# Patient Record
Sex: FEMALE | Race: BLACK OR AFRICAN AMERICAN | NOT HISPANIC OR LATINO | Employment: UNEMPLOYED | URBAN - METROPOLITAN AREA
[De-identification: names, ages, dates, MRNs, and addresses within clinical notes are randomized per-mention and may not be internally consistent; named-entity substitution may affect disease eponyms.]

---

## 2023-10-05 PROBLEM — Z86.39 H/O HYPERCHOLESTEROLEMIA: Status: ACTIVE | Noted: 2023-10-05

## 2023-10-05 PROBLEM — L08.9 LOCAL INFECTION OF WOUND: Status: ACTIVE | Noted: 2023-10-05

## 2023-10-05 PROBLEM — S31.109A OPEN WOUND OF ABDOMEN: Status: ACTIVE | Noted: 2023-10-05

## 2023-10-05 PROBLEM — E66.01 OBESITY, CLASS III, BMI 40-49.9 (MORBID OBESITY) (MULTI): Status: ACTIVE | Noted: 2023-10-05

## 2023-10-05 PROBLEM — R10.2 PELVIC PAIN IN FEMALE: Status: ACTIVE | Noted: 2023-10-05

## 2023-10-05 PROBLEM — Z86.79 H/O: HYPERTENSION: Status: ACTIVE | Noted: 2023-10-05

## 2023-10-05 PROBLEM — T14.8XXA LOCAL INFECTION OF WOUND: Status: ACTIVE | Noted: 2023-10-05

## 2023-10-05 PROBLEM — R63.2 POLYPHAGIA: Status: ACTIVE | Noted: 2023-10-05

## 2023-10-05 PROBLEM — Z04.9 CONDITION NOT FOUND: Status: ACTIVE | Noted: 2023-10-05

## 2023-10-05 RX ORDER — METFORMIN HYDROCHLORIDE EXTENDED-RELEASE TABLETS 500 MG/1
TABLET, FILM COATED, EXTENDED RELEASE ORAL
COMMUNITY

## 2023-10-05 RX ORDER — HYDROCHLOROTHIAZIDE 12.5 MG/1
1 CAPSULE ORAL DAILY
COMMUNITY
Start: 2019-07-22

## 2023-10-05 RX ORDER — CHOLECALCIFEROL (VITAMIN D3) 50 MCG
1 TABLET ORAL DAILY
COMMUNITY
Start: 2019-07-22

## 2023-10-05 RX ORDER — AMLODIPINE BESYLATE 10 MG/1
1 TABLET ORAL DAILY
COMMUNITY
Start: 2019-07-25

## 2023-10-05 RX ORDER — ATORVASTATIN CALCIUM 20 MG/1
1 TABLET, FILM COATED ORAL NIGHTLY
COMMUNITY
Start: 2019-07-22

## 2023-10-11 ENCOUNTER — APPOINTMENT (OUTPATIENT)
Dept: PRIMARY CARE | Facility: HOSPITAL | Age: 53
End: 2023-10-11

## 2023-10-11 ENCOUNTER — CLINICAL SUPPORT (OUTPATIENT)
Dept: SURGERY | Facility: CLINIC | Age: 53
End: 2023-10-11
Payer: COMMERCIAL

## 2023-10-11 VITALS
HEIGHT: 69 IN | RESPIRATION RATE: 18 BRPM | HEART RATE: 88 BPM | WEIGHT: 277 LBS | BODY MASS INDEX: 41.03 KG/M2 | SYSTOLIC BLOOD PRESSURE: 134 MMHG | DIASTOLIC BLOOD PRESSURE: 83 MMHG

## 2023-10-11 DIAGNOSIS — Z51.89 VISIT FOR WOUND CARE: Primary | ICD-10-CM

## 2023-10-11 DIAGNOSIS — Z71.9 HEALTH EDUCATION: ICD-10-CM

## 2023-10-11 PROCEDURE — 99213 OFFICE O/P EST LOW 20 MIN: CPT | Performed by: PHYSICIAN ASSISTANT

## 2023-10-11 ASSESSMENT — PAIN SCALES - GENERAL: PAINLEVEL: 0-NO PAIN

## 2023-10-11 NOTE — PROGRESS NOTES
[facility]  TRAUMA CLINIC PROGRESS NOTE    Patient Name: Kim Hartman  MRN: [id number]  Admit Date:   : 1970  AGE: 53 y.o.   GENDER: female  ==============================================================================  CHIEF COMPLAINT:   Midline abdominal wound check and wound care. Last visit was 23 in which 1/4 inch nagauze was used for ongoing packing.     OTHER MEDICAL PROBLEMS:  HLD, HTN and SBO s/p  ex lap, extensive IAN >4 hours, SBR, excision of hernia sac, primary fascial closure,  bilateral salpingectomy and L oophorectomy (combo case with ACS and gyn) c/b intrahospitalization SSI d/c with wound care instructions c/b superficial wound opening on .     INCIDENTAL FINDINGS:  N/A    PROCEDURES:   ex lap, extensive IAN >4 hours, SBR, excision of hernia sac, primary fascial closure,  bilateral salpingectomy and L oophorectomy (combo case with ACS and gyn) c/b intrahospitalization SSI d/c with wound care instructions c/b superficial wound opening on .     PATHOLOGY:  FINAL DIAGNOSIS  A.  RIGHT FALLOPIAN TUBE:  -- FALLOPIAN TUBE WITH ESSURE COIL AND PARATUBAL CYST.      B.  LEFT TUBE, OVARY, AND CYST:  -- OVARY AND FALLOPIAN TUBE WITH TUBO-OVARIAN ADHESIONS AND SMALL SEROUS CYSTS    : SASKIA Londono (Parts A and B, all slides)    C.  SMALL BOWEL, RESECTION:    --ONE SEGMENT OF SMALL BOWEL WITH ISCHEMIC PATTERN OF INJURY, ULCERATION AND  TRANSMURAL HEMORRHAGE.  --SECOND SEGMENT OF SMALL BOWEL WITH INTACT ANASTOMOTIC SITE.  --SEROSAL ADHESIONS AND ACUTE SEROSITIS.  --REACTIVE LYMPH NODE.    ==============================================================================  TODAY'S ASSESSMENT AND PLAN OF CARE:  Wound is healed with a tiny pinhole located in the distal portion of the midline draining a small amount of clear fluid.    ==============================================================================  HISTORY OF PRESENT  ILLNESS  Ms. Hartman is a 54 y/o F who received an ex lap in 5/23 for SBO, extensive IAN, SBR, excision of hernia sca, primary fascial closure, bilateral salpingectomy and L oophorectomy. Patients wound re-opened at the end of June. Since then patient has been back and fourth between clinic and the ED for wound infections. She re-presented to the ED on 9/04 for wound infection. She was taken to the OR on 09/05 for I&D of skin and soft tissue infection of abdominal wall. ID was consulted with positive wound cultures for klebsiella and streptococcus anginosus. Midline placed on 09/08 and she was discharged on IV ceftriaxone until 9/22. On 9/20/23 Patient was seen in clinic for wound care, healing wound at that time.     Patient is eating, drinking, voiding and having flatus, bowel movements.   MEDICAL HISTORY / ROS:  Admission history and ROS reviewed.   Patient denies:  fevers; chills; headache;  dizziness; chest pain; shortness of breath; nausea/vomiting/diarrhea/constipation; new/worsening abdominal pain or numbness/tingling/weakness of extremities.   Pertinent changes as follows:  Please place small 4x4 over abdominal wound until completely healed without drainage.     PHYSICAL EXAM:  GCS 15, A+OX3, RRR, S1, S2, CTA=, no increased WOB. Abd soft, nt, nd. MAEx4, REBECCA 5/5 x4, no extremity edema noted. 2+pp.   Wound description: Healed midline incision with pinhole draining small amount clear fluid     LABS:  No results found for this or any previous visit (from the past 24 hour(s)).  MEDICATIONS:  Current Outpatient Medications   Medication Sig Dispense Refill    amLODIPine (Norvasc) 10 mg tablet Take 1 tablet (10 mg) by mouth once daily.      atorvastatin (Lipitor) 20 mg tablet Take 1 tablet (20 mg) by mouth once daily at bedtime.      cholecalciferol (Vitamin D-3) 50 MCG (2000 UT) tablet Take 1 tablet (50 mcg) by mouth once daily.      hydroCHLOROthiazide (Microzide) 12.5 mg capsule Take 1 capsule (12.5 mg) by  mouth once daily.      metFORMIN, OSM, (Fortamet) 500 mg 24 hr tablet Do not crush, chew, or split.      amoxicillin-pot clavulanate (Augmentin) 400-57 mg/5 mL suspension TAKE 11 ML BY MOUTH TWO TIMES A DAY FOR 14 DAYS. DISCARD REMAINDER. (Patient not taking: Reported on 10/11/2023) 400 mL 0    cefTRIAXone (Rocephin) 2 gram 2 GRAM(S) INTRAVENOUSLY ONCE A DAY (Patient not taking: Reported on 10/11/2023) 26 each 0    oxyCODONE (Roxicodone) 5 mg immediate release tablet TAKE 1 TABLET BY MOUTH EVERY 6 HOURS (Patient not taking: Reported on 10/11/2023) 7 tablet 0     No current facility-administered medications for this visit.       IMAGING SUMMARY:  (summary of new imaging findings, not a copy of dictation)      I have reviewed all laboratory and imaging results ordered/pertinent for today's encounter.

## 2023-10-16 ENCOUNTER — OFFICE VISIT (OUTPATIENT)
Dept: PRIMARY CARE | Facility: HOSPITAL | Age: 53
End: 2023-10-16
Payer: COMMERCIAL

## 2023-10-16 VITALS
TEMPERATURE: 96.4 F | DIASTOLIC BLOOD PRESSURE: 79 MMHG | WEIGHT: 273.8 LBS | HEART RATE: 98 BPM | BODY MASS INDEX: 40.55 KG/M2 | OXYGEN SATURATION: 98 % | SYSTOLIC BLOOD PRESSURE: 115 MMHG | HEIGHT: 69 IN

## 2023-10-16 DIAGNOSIS — D64.9 ANEMIA, UNSPECIFIED TYPE: ICD-10-CM

## 2023-10-16 DIAGNOSIS — Z00.00 HEALTHCARE MAINTENANCE: Primary | ICD-10-CM

## 2023-10-16 LAB
ALBUMIN SERPL BCP-MCNC: 4.1 G/DL (ref 3.4–5)
ALP SERPL-CCNC: 71 U/L (ref 33–110)
ALT SERPL W P-5'-P-CCNC: 8 U/L (ref 7–45)
ANION GAP SERPL CALC-SCNC: 11 MMOL/L (ref 10–20)
AST SERPL W P-5'-P-CCNC: 9 U/L (ref 9–39)
BILIRUB SERPL-MCNC: 0.4 MG/DL (ref 0–1.2)
BUN SERPL-MCNC: 13 MG/DL (ref 6–23)
CALCIUM SERPL-MCNC: 9.2 MG/DL (ref 8.6–10.6)
CHLORIDE SERPL-SCNC: 106 MMOL/L (ref 98–107)
CHOLEST SERPL-MCNC: 247 MG/DL (ref 0–199)
CHOLESTEROL/HDL RATIO: 7.7
CO2 SERPL-SCNC: 29 MMOL/L (ref 21–32)
CREAT SERPL-MCNC: 0.99 MG/DL (ref 0.5–1.05)
ERYTHROCYTE [DISTWIDTH] IN BLOOD BY AUTOMATED COUNT: 19.5 % (ref 11.5–14.5)
EST. AVERAGE GLUCOSE BLD GHB EST-MCNC: 120 MG/DL
FERRITIN SERPL-MCNC: 70 NG/ML (ref 8–150)
GFR SERPL CREATININE-BSD FRML MDRD: 68 ML/MIN/1.73M*2
GLUCOSE SERPL-MCNC: 81 MG/DL (ref 74–99)
HBA1C MFR BLD: 5.8 %
HCT VFR BLD AUTO: 39.2 % (ref 36–46)
HDLC SERPL-MCNC: 31.9 MG/DL
HGB BLD-MCNC: 12 G/DL (ref 12–16)
IRON SATN MFR SERPL: 23 % (ref 25–45)
IRON SERPL-MCNC: 71 UG/DL (ref 35–150)
LDLC SERPL CALC-MCNC: 178 MG/DL
MCH RBC QN AUTO: 25.6 PG (ref 26–34)
MCHC RBC AUTO-ENTMCNC: 30.6 G/DL (ref 32–36)
MCV RBC AUTO: 84 FL (ref 80–100)
NON HDL CHOLESTEROL: 215 MG/DL (ref 0–149)
NRBC BLD-RTO: 0 /100 WBCS (ref 0–0)
PLATELET # BLD AUTO: 336 X10*3/UL (ref 150–450)
PMV BLD AUTO: 9.3 FL (ref 7.5–11.5)
POTASSIUM SERPL-SCNC: 4.3 MMOL/L (ref 3.5–5.3)
PROT SERPL-MCNC: 7.7 G/DL (ref 6.4–8.2)
RBC # BLD AUTO: 4.69 X10*6/UL (ref 4–5.2)
SODIUM SERPL-SCNC: 142 MMOL/L (ref 136–145)
TIBC SERPL-MCNC: 306 UG/DL (ref 240–445)
TRANSFERRIN SERPL-MCNC: 212 MG/DL (ref 200–360)
TRIGL SERPL-MCNC: 188 MG/DL (ref 0–149)
TSH SERPL-ACNC: 2.46 MIU/L (ref 0.44–3.98)
UIBC SERPL-MCNC: 235 UG/DL (ref 110–370)
VLDL: 38 MG/DL (ref 0–40)
WBC # BLD AUTO: 6.5 X10*3/UL (ref 4.4–11.3)

## 2023-10-16 PROCEDURE — 84443 ASSAY THYROID STIM HORMONE: CPT | Mod: CMCLAB

## 2023-10-16 PROCEDURE — 99204 OFFICE O/P NEW MOD 45 MIN: CPT

## 2023-10-16 PROCEDURE — 36415 COLL VENOUS BLD VENIPUNCTURE: CPT

## 2023-10-16 PROCEDURE — 99214 OFFICE O/P EST MOD 30 MIN: CPT | Mod: 25,GC

## 2023-10-16 PROCEDURE — 80053 COMPREHEN METABOLIC PANEL: CPT | Mod: CMCLAB

## 2023-10-16 PROCEDURE — 1036F TOBACCO NON-USER: CPT

## 2023-10-16 PROCEDURE — 84466 ASSAY OF TRANSFERRIN: CPT | Mod: CMCLAB

## 2023-10-16 PROCEDURE — 80061 LIPID PANEL: CPT

## 2023-10-16 PROCEDURE — 3008F BODY MASS INDEX DOCD: CPT

## 2023-10-16 PROCEDURE — 83036 HEMOGLOBIN GLYCOSYLATED A1C: CPT

## 2023-10-16 PROCEDURE — 83550 IRON BINDING TEST: CPT | Mod: CMCLAB

## 2023-10-16 PROCEDURE — 82728 ASSAY OF FERRITIN: CPT | Mod: CMCLAB

## 2023-10-16 PROCEDURE — 85027 COMPLETE CBC AUTOMATED: CPT | Mod: CMCLAB

## 2023-10-16 ASSESSMENT — COLUMBIA-SUICIDE SEVERITY RATING SCALE - C-SSRS
2. HAVE YOU ACTUALLY HAD ANY THOUGHTS OF KILLING YOURSELF?: NO
6. HAVE YOU EVER DONE ANYTHING, STARTED TO DO ANYTHING, OR PREPARED TO DO ANYTHING TO END YOUR LIFE?: NO
1. IN THE PAST MONTH, HAVE YOU WISHED YOU WERE DEAD OR WISHED YOU COULD GO TO SLEEP AND NOT WAKE UP?: NO

## 2023-10-16 ASSESSMENT — PATIENT HEALTH QUESTIONNAIRE - PHQ9
1. LITTLE INTEREST OR PLEASURE IN DOING THINGS: NOT AT ALL
10. IF YOU CHECKED OFF ANY PROBLEMS, HOW DIFFICULT HAVE THESE PROBLEMS MADE IT FOR YOU TO DO YOUR WORK, TAKE CARE OF THINGS AT HOME, OR GET ALONG WITH OTHER PEOPLE: SOMEWHAT DIFFICULT
2. FEELING DOWN, DEPRESSED OR HOPELESS: SEVERAL DAYS
SUM OF ALL RESPONSES TO PHQ9 QUESTIONS 1 AND 2: 1

## 2023-10-16 ASSESSMENT — ENCOUNTER SYMPTOMS
OCCASIONAL FEELINGS OF UNSTEADINESS: 0
DEPRESSION: 0
LOSS OF SENSATION IN FEET: 0

## 2023-10-16 ASSESSMENT — LIFESTYLE VARIABLES
HOW MANY STANDARD DRINKS CONTAINING ALCOHOL DO YOU HAVE ON A TYPICAL DAY: PATIENT DOES NOT DRINK
HOW OFTEN DO YOU HAVE SIX OR MORE DRINKS ON ONE OCCASION: NEVER
AUDIT-C TOTAL SCORE: 0
HOW OFTEN DO YOU HAVE A DRINK CONTAINING ALCOHOL: NEVER
SKIP TO QUESTIONS 9-10: 1

## 2023-10-16 ASSESSMENT — PAIN SCALES - GENERAL: PAINLEVEL: 0-NO PAIN

## 2023-10-16 NOTE — PROGRESS NOTES
CC:  HPI:   CURLY IVAN is a 53 year old Female with a hx of HLD, HTN and SBO s/p  ex lap, extensive IAN, SBR, excision of hernia sac, primary fascial closure,  bilateral salpingectomy and L oophorectomy, here establish care.    Patient has no acute complaints. Asked to get covid vaccine. Has been donating plasma. Was told that her iron was low and wants to see if it needs correction. Has double carpal tunnel and asking for handicap stickers.     ROS:  GENERAL.: Denies weight change, N/V/F/C, trouble sleeping.  EYES: Denies vision lose, double vision, blurry vision.   ENT: Denies sore throat, runny nose, congestion, trouble swallowing.  CARDIO: Denies chest pain, palpitations, orthopnea, PND.  PULMONARY: Denies shortness of breath, cough.  GI: Denies abdominal pain, constipation, diarrhea, bloody or black stools.  : Denies, frequency, urgency, dysuria, hematuria.  MS: Denies limb pain, joint pain.  SKIN: Denies rashes.  PSYCH: Denies change in mood.  Review of systems is otherwise negative unless stated above or in history of present illness    PMH:  As per HPI  PSH:  3   3 Hernia repairs  SBO s/p ex lap x2   and  papilloma in both breasts, non cancerous    FH:  Patient is adopted  Breast cancer in biological mother when she was 79    Meds:  Atorvastatin 20   Hydrochlorothiazide 12.5  Metformin 1000 mg   Vitamin D   Amlodipine 10 mg    Allergies:  Nickel  Gabapentin: SI    Social:  Living situation: lives with , 2 daughters (22. 25)   Work: not anymore, [employer] prior  Alcohol: rarely   Tobacco: quit cigarettes 9 years ago, 1 pack a week  Other drugs: never  Sexually active: yes    Immunizations:  Screening:  -Cardiovascular:  Lipid panel ordered   -Diabetes:    Require med refills?  No     Objective:    Vitals:  HR - , BP - , O2 - ,Wt - , BMI -   Exam:  GENERAL.: Vitals noted, no distress.   HEENT: Normocephalic, atraumatic, MMM. No lymphadenopathy.  EYES: PERRLA, EOMI. Normal  conjunctiva. No scleral icterus  NECK: Supple, FROM  CV: Regular rate rhythm. No murmurs appreciated. Intact radial pulses. Cap refill < 2 seconds  LUNGS: Clear to auscultation bilaterally. Symmetric chest rise. No wheezes, rales, or rhonchi  ABDOMEN: Soft, nontender. No distention. BS+. Surgical site intact with no erythema or purulent drainage   EXTREMITIES: No edema. FROM  SKIN: No rash  NEURO: No focal neurologic deficits. CN II-XII grossly intact.  PSYCH: Appropriate mood and affect    Assessment/Plan  CURLY IVAN is a 53 year old Female with a hx of HLD, HTN and SBO s/p 5/26 ex lap, extensive IAN, SBR, excision of hernia sac, primary fascial closure,  bilateral salpingectomy and L oophorectomy, here establish care.    #Anemia  ::Hemoglobin 9-10  -CBC and iron studies ordered   -Instructed pt to hold on donating plasma and blood until labs are back     #HTN  -Continue Amlodipine and hydrochlorothiazide    #HLD  -Continue Atorvastatin 20 mg for primary prevention  -Repeat lipids today    #Hx of diabetes  #Obesity BMI > 40  -Continue Metformin 1000 mg daily  -Repeat A1c  -Consider Semaglutide injections if A1c > 6.4%    #Hx of SBO s/p Ex lap, L oophorectomy, bilateral salpingectomy and hernia excision in 5/2023  -Surgical site intact  -Follows with surgery in clinic for wound management    #Health Maintenance  CBC  CMP  TSH    #Immunizations:  -Flu vaccine given this year 2023  -Covid vaccine to be given at [facility]  #Screening:  -Cardiovascular:  Lipid panel ordered today, goal < 100     -Diabetes:  A1c ordered today    -Breast (last mammogram with papillomas non-cancerous s/p surgery in 2022, new order placed today)  -Cervical (does not remember last time though it was normal, new order placed today)  -Colorectal (last colonoscopy 2022 normal)  -Lung (not indicated at this time)    -Infectious Disease:  Had syphilis in 1992 while pregnant, treated for it    RTC in 3 months

## 2023-10-16 NOTE — PATIENT INSTRUCTIONS
Discussion:  It was very nice to see you in the clinic today. These are the things we talked about today.  Your low hemoglobin and ability to donate plasma, we will redo your blood tests today to check your hemoglobin. Please do not donate plasma or blood before lab results are back.   We will also check your kidney, liver, and diabetes markers labs  We will refer you to gynecology to get your pap smear  We will put in an order for you to get your mammogram    Please return to clinic in 6 months or earlier if you need to   If you have any questions please call our office at [phone].

## 2023-10-20 NOTE — PROGRESS NOTES
I saw and evaluated the patient. I personally obtained the key and critical portions of the history and physical exam or was physically present for key and critical portions performed by the resident/fellow. I reviewed the resident/fellow's documentation and discussed the patient with the resident/fellow. I agree with the resident/fellow's medical decision making as documented in the note.    Phillip Jovel MD

## 2023-11-09 ENCOUNTER — HOSPITAL ENCOUNTER (OUTPATIENT)
Dept: RADIOLOGY | Facility: HOSPITAL | Age: 53
Discharge: HOME | End: 2023-11-09
Payer: COMMERCIAL

## 2023-11-09 DIAGNOSIS — Z00.00 HEALTHCARE MAINTENANCE: ICD-10-CM

## 2023-11-09 PROCEDURE — 77063 BREAST TOMOSYNTHESIS BI: CPT | Performed by: RADIOLOGY

## 2023-11-09 PROCEDURE — 77067 SCR MAMMO BI INCL CAD: CPT | Performed by: RADIOLOGY

## 2023-11-09 PROCEDURE — 77067 SCR MAMMO BI INCL CAD: CPT

## 2023-11-13 ENCOUNTER — HOSPITAL ENCOUNTER (OUTPATIENT)
Dept: RADIOLOGY | Facility: EXTERNAL LOCATION | Age: 53
Discharge: HOME | End: 2023-11-13
Payer: COMMERCIAL

## 2023-11-20 ENCOUNTER — APPOINTMENT (OUTPATIENT)
Dept: RADIOLOGY | Facility: HOSPITAL | Age: 53
End: 2023-11-20
Payer: COMMERCIAL

## 2023-11-21 ENCOUNTER — HOSPITAL ENCOUNTER (OUTPATIENT)
Dept: RADIOLOGY | Facility: HOSPITAL | Age: 53
Discharge: HOME | End: 2023-11-21
Payer: COMMERCIAL

## 2023-11-21 DIAGNOSIS — R92.8 OTHER ABNORMAL AND INCONCLUSIVE FINDINGS ON DIAGNOSTIC IMAGING OF BREAST: ICD-10-CM

## 2023-11-21 PROCEDURE — 76642 ULTRASOUND BREAST LIMITED: CPT | Mod: LT

## 2023-11-21 PROCEDURE — 76642 ULTRASOUND BREAST LIMITED: CPT | Mod: LEFT SIDE | Performed by: RADIOLOGY

## 2023-11-21 PROCEDURE — 77065 DX MAMMO INCL CAD UNI: CPT | Mod: LEFT SIDE | Performed by: RADIOLOGY

## 2023-11-21 PROCEDURE — G0279 TOMOSYNTHESIS, MAMMO: HCPCS | Performed by: RADIOLOGY

## 2023-11-21 PROCEDURE — 77065 DX MAMMO INCL CAD UNI: CPT | Mod: LT

## 2023-12-04 NOTE — PROGRESS NOTES
Patient received screening mammogram on 11/9/2023 showing Left masslike asymmetry, and was recommended to get an ultrasound of left breast     Ultrasound of left breast and mammogram tomosynthesis on 11/21/2023 showed No evidence of malignancy. Lymph node in the left breast at the 3 o'clock position 13 cm from the nipple corresponding to the mammographic finding.     Recommendation is for annual bilateral mammography.    An attempt to call the patient to make sure she is aware of this was made but patient did not answer the phone.     George Santoyo, pgy-2

## 2024-06-09 ENCOUNTER — HOSPITAL ENCOUNTER (EMERGENCY)
Facility: HOSPITAL | Age: 54
Discharge: HOME | End: 2024-06-09
Attending: EMERGENCY MEDICINE
Payer: COMMERCIAL

## 2024-06-09 ENCOUNTER — APPOINTMENT (OUTPATIENT)
Dept: RADIOLOGY | Facility: HOSPITAL | Age: 54
End: 2024-06-09
Payer: COMMERCIAL

## 2024-06-09 VITALS
RESPIRATION RATE: 16 BRPM | BODY MASS INDEX: 39.99 KG/M2 | WEIGHT: 270 LBS | OXYGEN SATURATION: 98 % | TEMPERATURE: 97 F | HEART RATE: 99 BPM | SYSTOLIC BLOOD PRESSURE: 101 MMHG | DIASTOLIC BLOOD PRESSURE: 66 MMHG | HEIGHT: 69 IN

## 2024-06-09 DIAGNOSIS — K52.9 COLITIS: Primary | ICD-10-CM

## 2024-06-09 LAB
ALBUMIN SERPL BCP-MCNC: 4.4 G/DL (ref 3.4–5)
ALP SERPL-CCNC: 77 U/L (ref 33–110)
ALT SERPL W P-5'-P-CCNC: 4 U/L (ref 7–45)
ANION GAP SERPL CALC-SCNC: 17 MMOL/L (ref 10–20)
APPEARANCE UR: CLEAR
AST SERPL W P-5'-P-CCNC: 15 U/L (ref 9–39)
BASOPHILS # BLD AUTO: 0.02 X10*3/UL (ref 0–0.1)
BASOPHILS NFR BLD AUTO: 0.3 %
BILIRUB SERPL-MCNC: 0.4 MG/DL (ref 0–1.2)
BILIRUB UR STRIP.AUTO-MCNC: NEGATIVE MG/DL
BUN SERPL-MCNC: 11 MG/DL (ref 6–23)
CALCIUM SERPL-MCNC: 9.5 MG/DL (ref 8.6–10.6)
CHLORIDE SERPL-SCNC: 105 MMOL/L (ref 98–107)
CO2 SERPL-SCNC: 20 MMOL/L (ref 21–32)
COLOR UR: YELLOW
CREAT SERPL-MCNC: 1.43 MG/DL (ref 0.5–1.05)
EGFRCR SERPLBLD CKD-EPI 2021: 44 ML/MIN/1.73M*2
EOSINOPHIL # BLD AUTO: 0.07 X10*3/UL (ref 0–0.7)
EOSINOPHIL NFR BLD AUTO: 1 %
ERYTHROCYTE [DISTWIDTH] IN BLOOD BY AUTOMATED COUNT: 13.4 % (ref 11.5–14.5)
GLUCOSE SERPL-MCNC: 85 MG/DL (ref 74–99)
GLUCOSE UR STRIP.AUTO-MCNC: NORMAL MG/DL
HCT VFR BLD AUTO: 40.9 % (ref 36–46)
HGB BLD-MCNC: 13.9 G/DL (ref 12–16)
HYALINE CASTS #/AREA URNS AUTO: ABNORMAL /LPF
IMM GRANULOCYTES # BLD AUTO: 0.03 X10*3/UL (ref 0–0.7)
IMM GRANULOCYTES NFR BLD AUTO: 0.4 % (ref 0–0.9)
KETONES UR STRIP.AUTO-MCNC: NEGATIVE MG/DL
LEUKOCYTE ESTERASE UR QL STRIP.AUTO: NEGATIVE
LYMPHOCYTES # BLD AUTO: 1.26 X10*3/UL (ref 1.2–4.8)
LYMPHOCYTES NFR BLD AUTO: 17.6 %
MAGNESIUM SERPL-MCNC: 2.34 MG/DL (ref 1.6–2.4)
MCH RBC QN AUTO: 27.9 PG (ref 26–34)
MCHC RBC AUTO-ENTMCNC: 34 G/DL (ref 32–36)
MCV RBC AUTO: 82 FL (ref 80–100)
MONOCYTES # BLD AUTO: 0.48 X10*3/UL (ref 0.1–1)
MONOCYTES NFR BLD AUTO: 6.7 %
MUCOUS THREADS #/AREA URNS AUTO: ABNORMAL /LPF
NEUTROPHILS # BLD AUTO: 5.28 X10*3/UL (ref 1.2–7.7)
NEUTROPHILS NFR BLD AUTO: 74 %
NITRITE UR QL STRIP.AUTO: NEGATIVE
NRBC BLD-RTO: 0 /100 WBCS (ref 0–0)
PH UR STRIP.AUTO: 5.5 [PH]
PLATELET # BLD AUTO: 306 X10*3/UL (ref 150–450)
POTASSIUM SERPL-SCNC: 4.6 MMOL/L (ref 3.5–5.3)
PROT SERPL-MCNC: 8.3 G/DL (ref 6.4–8.2)
PROT UR STRIP.AUTO-MCNC: ABNORMAL MG/DL
RBC # BLD AUTO: 4.98 X10*6/UL (ref 4–5.2)
RBC # UR STRIP.AUTO: NEGATIVE /UL
RBC #/AREA URNS AUTO: ABNORMAL /HPF
SODIUM SERPL-SCNC: 137 MMOL/L (ref 136–145)
SP GR UR STRIP.AUTO: 1.03
SQUAMOUS #/AREA URNS AUTO: ABNORMAL /HPF
UROBILINOGEN UR STRIP.AUTO-MCNC: NORMAL MG/DL
WBC # BLD AUTO: 7.1 X10*3/UL (ref 4.4–11.3)
WBC #/AREA URNS AUTO: ABNORMAL /HPF

## 2024-06-09 PROCEDURE — 2550000001 HC RX 255 CONTRASTS: Mod: SE | Performed by: EMERGENCY MEDICINE

## 2024-06-09 PROCEDURE — 2500000004 HC RX 250 GENERAL PHARMACY W/ HCPCS (ALT 636 FOR OP/ED): Mod: SE

## 2024-06-09 PROCEDURE — 99284 EMERGENCY DEPT VISIT MOD MDM: CPT | Mod: 25

## 2024-06-09 PROCEDURE — 80053 COMPREHEN METABOLIC PANEL: CPT | Performed by: EMERGENCY MEDICINE

## 2024-06-09 PROCEDURE — 81001 URINALYSIS AUTO W/SCOPE: CPT | Performed by: EMERGENCY MEDICINE

## 2024-06-09 PROCEDURE — 96372 THER/PROPH/DIAG INJ SC/IM: CPT

## 2024-06-09 PROCEDURE — 85025 COMPLETE CBC W/AUTO DIFF WBC: CPT

## 2024-06-09 PROCEDURE — 96360 HYDRATION IV INFUSION INIT: CPT | Mod: 59

## 2024-06-09 PROCEDURE — 83735 ASSAY OF MAGNESIUM: CPT

## 2024-06-09 PROCEDURE — 36415 COLL VENOUS BLD VENIPUNCTURE: CPT | Performed by: EMERGENCY MEDICINE

## 2024-06-09 PROCEDURE — 74177 CT ABD & PELVIS W/CONTRAST: CPT | Performed by: STUDENT IN AN ORGANIZED HEALTH CARE EDUCATION/TRAINING PROGRAM

## 2024-06-09 PROCEDURE — 87506 IADNA-DNA/RNA PROBE TQ 6-11: CPT | Performed by: EMERGENCY MEDICINE

## 2024-06-09 PROCEDURE — 74177 CT ABD & PELVIS W/CONTRAST: CPT

## 2024-06-09 RX ORDER — DICYCLOMINE HYDROCHLORIDE 10 MG/ML
20 INJECTION INTRAMUSCULAR ONCE
Status: COMPLETED | OUTPATIENT
Start: 2024-06-09 | End: 2024-06-09

## 2024-06-09 RX ORDER — LOPERAMIDE HYDROCHLORIDE 2 MG/1
2 CAPSULE ORAL 4 TIMES DAILY PRN
Qty: 12 CAPSULE | Refills: 0 | Status: SHIPPED | OUTPATIENT
Start: 2024-06-09 | End: 2024-06-12

## 2024-06-09 RX ADMIN — IOHEXOL 90 ML: 350 INJECTION, SOLUTION INTRAVENOUS at 18:53

## 2024-06-09 RX ADMIN — DICYCLOMINE HYDROCHLORIDE 20 MG: 20 INJECTION, SOLUTION INTRAMUSCULAR at 16:52

## 2024-06-09 RX ADMIN — SODIUM CHLORIDE, POTASSIUM CHLORIDE, SODIUM LACTATE AND CALCIUM CHLORIDE 1000 ML: 600; 310; 30; 20 INJECTION, SOLUTION INTRAVENOUS at 16:52

## 2024-06-09 ASSESSMENT — PAIN DESCRIPTION - PROGRESSION: CLINICAL_PROGRESSION: OTHER (COMMENT)

## 2024-06-09 ASSESSMENT — COLUMBIA-SUICIDE SEVERITY RATING SCALE - C-SSRS
1. IN THE PAST MONTH, HAVE YOU WISHED YOU WERE DEAD OR WISHED YOU COULD GO TO SLEEP AND NOT WAKE UP?: NO
6. HAVE YOU EVER DONE ANYTHING, STARTED TO DO ANYTHING, OR PREPARED TO DO ANYTHING TO END YOUR LIFE?: NO
2. HAVE YOU ACTUALLY HAD ANY THOUGHTS OF KILLING YOURSELF?: NO

## 2024-06-09 ASSESSMENT — PAIN SCALES - GENERAL: PAINLEVEL_OUTOF10: 3

## 2024-06-09 ASSESSMENT — PAIN - FUNCTIONAL ASSESSMENT: PAIN_FUNCTIONAL_ASSESSMENT: 0-10

## 2024-06-09 NOTE — ED PROVIDER NOTES
HPI   Chief Complaint   Patient presents with    Diarrhea       HPI  The patient  is a 53-year-old female with history of hypertension, hyperlipidemia, type 2 diabetes, CKD stage II, prior partial small bowel obstruction secondary to a hernia status post ex lap with extensive lysis of adhesions and bilateral salpingectomy and left oophorectomy in 5/23 now presenting to the with diarrhea.  Patient states her symptoms started Friday afternoon after she ate [employer].  She reports that she had a burger and some fries. Her  also ate [employer] with her but does not have similar symptoms.  She states that since then, she has had 10-12 nonbloody bowel movements a day that are watery.  She states initially they were more of a mustard yellow and now are green but has never had blood.  She had some dry heaving yesterday but no vomiting.  She does describe abdominal pain that is somewhat diffuse, more so in the lower abdomen.  She does have history of small bowel obstruction and states this does not feel anything similar.  She has not taken anything over-the-counter for pain or for diarrhea.  She denies any fevers or chills at home.  She has not had significant p.o. intake, but she did eat a piece of toast this morning, she has been tolerating fluids but feels like she is not keeping up with the amount of diarrhea that she is having. No trauma, falls, or injuries. No passing out. No headache, dizziness, vision changes, neck pain, back pain, chest pain, shortness of breath, urinary symptoms, numbness, tingling, fevers, or chills. No sick contacts or recent travels. No recent antibiotic use.                  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    Borderline high cholesterol    Personal history of other diseases of the circulatory system 07/22/2019    History of hypertension     Past Surgical History:    Procedure Laterality Date    BREAST BIOPSY Bilateral     OTHER SURGICAL HISTORY  2019    Hernia repair    OTHER SURGICAL HISTORY  2019    Foot surgery    OTHER SURGICAL HISTORY  2019     section    OTHER SURGICAL HISTORY  2019    Adhesiolysis     Family History   Problem Relation Name Age of Onset    Breast cancer Mother  70     Social History     Tobacco Use    Smoking status: Former     Types: Cigarettes    Smokeless tobacco: Never   Substance Use Topics    Alcohol use: Never    Drug use: Never       Physical Exam   ED Triage Vitals   Temperature Heart Rate Respirations BP   24 1556 24 1556 24 1556 24 1558   36.1 °C (97 °F) 99 16 101/66      Pulse Ox Temp Source Heart Rate Source Patient Position   24 1556 24 1556 -- --   98 % Temporal        BP Location FiO2 (%)     -- --             Physical Exam  Vitals and nursing note reviewed.   Constitutional:       General: She is not in acute distress.  HENT:      Head: Normocephalic and atraumatic.      Mouth/Throat:      Mouth: Mucous membranes are moist.   Eyes:      General: No scleral icterus.     Extraocular Movements: Extraocular movements intact.      Pupils: Pupils are equal, round, and reactive to light.   Cardiovascular:      Rate and Rhythm: Normal rate and regular rhythm.      Heart sounds: No murmur heard.  Pulmonary:      Effort: Pulmonary effort is normal. No respiratory distress.      Breath sounds: Normal breath sounds. No wheezing or rhonchi.   Abdominal:      General: Abdomen is flat. Bowel sounds are normal. There is no distension.      Palpations: Abdomen is soft.      Tenderness: There is abdominal tenderness. There is no guarding or rebound.      Comments: Midline abdominal incision, well-healed.  Mild tenderness to palpation diffusely, more so in the lower abdomen   Musculoskeletal:         General: No swelling or deformity. Normal range of motion.      Cervical back: Normal  range of motion and neck supple.   Skin:     General: Skin is warm and dry.      Findings: No rash.   Neurological:      General: No focal deficit present.      Mental Status: She is alert and oriented to person, place, and time.      Cranial Nerves: No cranial nerve deficit.      Sensory: No sensory deficit.      Motor: No weakness.   Psychiatric:         Mood and Affect: Mood normal.         Thought Content: Thought content normal.         ED Course & MDM   ED Course as of 06/09/24 2302   [place] Jun 09, 2024 1922 Creatinine(!): 1.43  KARIS on CKD, last creatinine was measured about 3 months ago that was 1.12 [AW]   2007 1. Recurrent wide neck ventral midline abdominal wall hernia containing multiple loops of small bowel as well as transverse colon,  without evidence of obstruction. There is circumferential thickening of the ascending colon and fluid throughout the transverse and descending colon compatible with colitis, which may be infectious or inflammatory.  2. Postsurgical changes from partial small bowel resection, bilateral salpingectomy, and left oophorectomy.  3. Right kidney atrophy and compensatory left kidney hypertrophy, similar to prior.   [AW]      ED Course User Index  [AW] Cristal Hargrove DO         Diagnoses as of 06/09/24 2302   Colitis       Medical Decision Making  Briefly this is a 53-year-old female presenting for abdominal pain and nonbloody diarrhea since Friday.  She is afebrile, saturating well on room air, borderline tachycardic but in no acute distress and does not appear fluid down on physical exam. The patient is in no respiratory distress, satting well on room air. She has mild diffuse tenderness to palpation to her abdomen but her abdomen is soft, non-peritonitic. Differential diagnosis is broad and includes but not limited to food poisoning, gastroenteritis, colitis, gastritis, diverticulitis, enteritis, pancreatitis, infection, electrolyte abnormality, viral process, or dehydration.  Will administer 1 L IV fluid bolus along with IM Bentyl to treat her symptoms and obtain basic labs to evaluate for electrolyte abnormalities and KARIS on CKD given reported extensive diarrhea.  Additionally obtained a CT abdomen pelvis to rule out diverticulitis or other acute process. CT abdomen pelvis showed signs of colitis, but no small bowel obstruction and no sign of diverticulitis.  CMP did have an elevated creatinine of 1.43 however this was not an KARIS compared to patient's baseline per review of EMR.  No leukocytosis or anemia on CBC. Urinalysis showed no evidence of any UTI. Magnesium was normal. Patient was able to give us a stool sample which was sent to the lab for testing with the results pending.   Patient was informed of the results. On reevaluation patient states that she felt better. She tolerated po challenge and continues to have soft, non-peritonitic abdominal exam. She was discharged home with instructions to increase her fluid intake and that she will be contacted if her stool pathogen comes back positive. The patient was instructed of supportive measures and to follow-up with a primary care physician. Return precautions were provided, for which the patient expressed understanding. The patient was discharged home in stable condition. They should feel free to return to the Emergency Department at any time should their condition worsen or should they have any questions or concerns.       Seen and discussed with Dr. Sutherland and Dr. Humberto Hargrove DO  Emergency Medicine  [facility]  PGY-1    Procedure  Procedures     Cristal Hargrove DO  Resident  06/09/24 4588    I saw and evaluated the patient. I personally obtained the key and critical portions of the history and physical exam or was physically present for key and critical portions performed by the resident/fellow. I reviewed the resident/fellow's documentation and discussed the patient with the  resident/fellow. I agree with the resident/fellow's medical decision making as documented in the note with the exception/addition of the following: Patient remains stable. She was signed out to my colleague, Dr. Claudio Paul at 5 PM pending all her labwork, urinalysis, stool culture, and CT abdomen/pelvis with IV contrast. Patient is pending remainder of  her ED course and final disposition at time of sign-out.     MD Chloe Dowling MD  06/10/24 0731

## 2024-06-10 LAB
C COLI+JEJ+UPSA DNA STL QL NAA+PROBE: DETECTED
EC STX1 GENE STL QL NAA+PROBE: NOT DETECTED
EC STX2 GENE STL QL NAA+PROBE: NOT DETECTED
HOLD SPECIMEN: NORMAL
NOROVIRUS GI + GII RNA STL NAA+PROBE: NOT DETECTED
RV RNA STL NAA+PROBE: NOT DETECTED
SALMONELLA DNA STL QL NAA+PROBE: NOT DETECTED
SHIGELLA DNA SPEC QL NAA+PROBE: NOT DETECTED
V CHOLERAE DNA STL QL NAA+PROBE: NOT DETECTED
Y ENTEROCOL DNA STL QL NAA+PROBE: NOT DETECTED

## 2024-06-10 NOTE — DISCHARGE INSTRUCTIONS
We will call you if the results from your stool culture are positive.     Please try to increase your fluid intake as possible. Return with any worsening symptoms, vomiting, blood in the stool, or any other concerning findings. Please follow up with your primary within the next week.

## 2024-06-11 ENCOUNTER — TELEPHONE (OUTPATIENT)
Dept: PHARMACY | Facility: HOSPITAL | Age: 54
End: 2024-06-11
Payer: COMMERCIAL

## 2024-06-11 DIAGNOSIS — A04.5 CAMPYLOBACTER DIARRHEA: Primary | ICD-10-CM

## 2024-06-11 RX ORDER — AZITHROMYCIN 500 MG/1
1000 TABLET, FILM COATED ORAL ONCE
Qty: 2 TABLET | Refills: 0 | Status: SHIPPED | OUTPATIENT
Start: 2024-06-11 | End: 2024-06-11

## 2024-06-11 NOTE — PROGRESS NOTES
EDPD Note: Initiation     Contacted Felicia Hartman regarding a positive Campylobacter stool culture/result that was taken during their recent emergency room visit. I completed education with patient . Patient states still experiencing diarrhea and would like to be treated. Informed patient typically it will resolve on its on, but will treat for mild campylobacter gastroenteritis due to still symplasmatic.  The following prescription was sent to the patient's preferred pharmacy. No further follow up needed from EDPD Team.     Drug: Azithromycin 500mg  Si tabs in single dose  Qty: 2  Days Supply: 1     Latest Reference Range & Units 24 19:18   Campylobacter Group Not Detected  Detected !   !: Data is abnormal    If there are any other questions for the ED Post-Discharge Culture Follow Up Team, please contact [phone]. Fax: [phone].    Yara Erickson, PharmD

## 2024-06-29 ENCOUNTER — HOSPITAL ENCOUNTER (INPATIENT)
Facility: HOSPITAL | Age: 54
End: 2024-06-29
Attending: EMERGENCY MEDICINE | Admitting: SURGERY
Payer: COMMERCIAL

## 2024-06-29 ENCOUNTER — APPOINTMENT (OUTPATIENT)
Dept: RADIOLOGY | Facility: HOSPITAL | Age: 54
End: 2024-06-29
Payer: COMMERCIAL

## 2024-06-29 DIAGNOSIS — K59.00 CONSTIPATION, UNSPECIFIED CONSTIPATION TYPE: Primary | ICD-10-CM

## 2024-06-29 PROCEDURE — 74018 RADEX ABDOMEN 1 VIEW: CPT | Performed by: RADIOLOGY

## 2024-06-29 PROCEDURE — 99285 EMERGENCY DEPT VISIT HI MDM: CPT

## 2024-06-29 PROCEDURE — 2500000005 HC RX 250 GENERAL PHARMACY W/O HCPCS: Mod: SE

## 2024-06-29 PROCEDURE — 74018 RADEX ABDOMEN 1 VIEW: CPT

## 2024-06-29 PROCEDURE — 99285 EMERGENCY DEPT VISIT HI MDM: CPT | Performed by: EMERGENCY MEDICINE

## 2024-06-29 RX ORDER — POLYETHYLENE GLYCOL 3350 17 G/17G
17 POWDER, FOR SOLUTION ORAL DAILY
Qty: 3 PACKET | Refills: 0 | Status: SHIPPED | OUTPATIENT
Start: 2024-06-29 | End: 2024-07-03

## 2024-06-29 RX ORDER — BISACODYL 5 MG
5 TABLET, DELAYED RELEASE (ENTERIC COATED) ORAL 2 TIMES DAILY
Qty: 10 TABLET | Refills: 0 | Status: SHIPPED | OUTPATIENT
Start: 2024-06-29 | End: 2024-07-05

## 2024-06-29 RX ADMIN — SODIUM PHOSPHATE 1 ENEMA: 7; 19 ENEMA RECTAL at 23:52

## 2024-06-29 ASSESSMENT — PAIN - FUNCTIONAL ASSESSMENT: PAIN_FUNCTIONAL_ASSESSMENT: 0-10

## 2024-06-29 ASSESSMENT — COLUMBIA-SUICIDE SEVERITY RATING SCALE - C-SSRS
2. HAVE YOU ACTUALLY HAD ANY THOUGHTS OF KILLING YOURSELF?: NO
1. IN THE PAST MONTH, HAVE YOU WISHED YOU WERE DEAD OR WISHED YOU COULD GO TO SLEEP AND NOT WAKE UP?: NO
6. HAVE YOU EVER DONE ANYTHING, STARTED TO DO ANYTHING, OR PREPARED TO DO ANYTHING TO END YOUR LIFE?: NO

## 2024-06-29 ASSESSMENT — PAIN DESCRIPTION - LOCATION: LOCATION: PERINEUM

## 2024-06-29 ASSESSMENT — LIFESTYLE VARIABLES
HAVE PEOPLE ANNOYED YOU BY CRITICIZING YOUR DRINKING: NO
EVER HAD A DRINK FIRST THING IN THE MORNING TO STEADY YOUR NERVES TO GET RID OF A HANGOVER: NO
HAVE YOU EVER FELT YOU SHOULD CUT DOWN ON YOUR DRINKING: NO
EVER FELT BAD OR GUILTY ABOUT YOUR DRINKING: NO
TOTAL SCORE: 0

## 2024-06-29 ASSESSMENT — PAIN DESCRIPTION - DESCRIPTORS: DESCRIPTORS: PRESSURE

## 2024-06-29 ASSESSMENT — PAIN SCALES - GENERAL: PAINLEVEL_OUTOF10: 4

## 2024-06-30 ENCOUNTER — APPOINTMENT (OUTPATIENT)
Dept: RADIOLOGY | Facility: HOSPITAL | Age: 54
End: 2024-06-30
Payer: COMMERCIAL

## 2024-06-30 VITALS
HEART RATE: 62 BPM | RESPIRATION RATE: 18 BRPM | DIASTOLIC BLOOD PRESSURE: 83 MMHG | SYSTOLIC BLOOD PRESSURE: 132 MMHG | HEIGHT: 69 IN | BODY MASS INDEX: 42.95 KG/M2 | TEMPERATURE: 97 F | WEIGHT: 290 LBS | OXYGEN SATURATION: 95 %

## 2024-06-30 PROBLEM — K59.00 CONSTIPATION, UNSPECIFIED CONSTIPATION TYPE: Status: ACTIVE | Noted: 2024-06-30

## 2024-06-30 PROBLEM — K85.90 PANCREATITIS, UNSPECIFIED PANCREATITIS TYPE (HHS-HCC): Status: ACTIVE | Noted: 2024-06-30

## 2024-06-30 LAB
ALBUMIN SERPL BCP-MCNC: 4.2 G/DL (ref 3.4–5)
ALP SERPL-CCNC: 78 U/L (ref 33–110)
ALT SERPL W P-5'-P-CCNC: 9 U/L (ref 7–45)
ANION GAP SERPL CALC-SCNC: 15 MMOL/L (ref 10–20)
AST SERPL W P-5'-P-CCNC: 8 U/L (ref 9–39)
BASOPHILS # BLD AUTO: 0.02 X10*3/UL (ref 0–0.1)
BASOPHILS NFR BLD AUTO: 0.3 %
BILIRUB SERPL-MCNC: 0.3 MG/DL (ref 0–1.2)
BUN SERPL-MCNC: 10 MG/DL (ref 6–23)
CALCIUM SERPL-MCNC: 9.6 MG/DL (ref 8.6–10.6)
CHLORIDE SERPL-SCNC: 104 MMOL/L (ref 98–107)
CO2 SERPL-SCNC: 25 MMOL/L (ref 21–32)
CREAT SERPL-MCNC: 1.11 MG/DL (ref 0.5–1.05)
EGFRCR SERPLBLD CKD-EPI 2021: 59 ML/MIN/1.73M*2
EOSINOPHIL # BLD AUTO: 0.11 X10*3/UL (ref 0–0.7)
EOSINOPHIL NFR BLD AUTO: 1.4 %
ERYTHROCYTE [DISTWIDTH] IN BLOOD BY AUTOMATED COUNT: 13.5 % (ref 11.5–14.5)
GLUCOSE BLD MANUAL STRIP-MCNC: 105 MG/DL (ref 74–99)
GLUCOSE BLD MANUAL STRIP-MCNC: 86 MG/DL (ref 74–99)
GLUCOSE SERPL-MCNC: 143 MG/DL (ref 74–99)
HCT VFR BLD AUTO: 37.5 % (ref 36–46)
HGB BLD-MCNC: 12.4 G/DL (ref 12–16)
IMM GRANULOCYTES # BLD AUTO: 0.02 X10*3/UL (ref 0–0.7)
IMM GRANULOCYTES NFR BLD AUTO: 0.3 % (ref 0–0.9)
LIPASE SERPL-CCNC: 418 U/L (ref 9–82)
LYMPHOCYTES # BLD AUTO: 2.22 X10*3/UL (ref 1.2–4.8)
LYMPHOCYTES NFR BLD AUTO: 28.9 %
MCH RBC QN AUTO: 27.3 PG (ref 26–34)
MCHC RBC AUTO-ENTMCNC: 33.1 G/DL (ref 32–36)
MCV RBC AUTO: 83 FL (ref 80–100)
MONOCYTES # BLD AUTO: 0.49 X10*3/UL (ref 0.1–1)
MONOCYTES NFR BLD AUTO: 6.4 %
NEUTROPHILS # BLD AUTO: 4.82 X10*3/UL (ref 1.2–7.7)
NEUTROPHILS NFR BLD AUTO: 62.7 %
NRBC BLD-RTO: 0 /100 WBCS (ref 0–0)
PLATELET # BLD AUTO: 358 X10*3/UL (ref 150–450)
POTASSIUM SERPL-SCNC: 4 MMOL/L (ref 3.5–5.3)
PROT SERPL-MCNC: 8.4 G/DL (ref 6.4–8.2)
RBC # BLD AUTO: 4.54 X10*6/UL (ref 4–5.2)
SODIUM SERPL-SCNC: 140 MMOL/L (ref 136–145)
TRIGL SERPL-MCNC: 143 MG/DL (ref 0–149)
WBC # BLD AUTO: 7.7 X10*3/UL (ref 4.4–11.3)

## 2024-06-30 PROCEDURE — 82947 ASSAY GLUCOSE BLOOD QUANT: CPT

## 2024-06-30 PROCEDURE — 76705 ECHO EXAM OF ABDOMEN: CPT | Performed by: RADIOLOGY

## 2024-06-30 PROCEDURE — 80053 COMPREHEN METABOLIC PANEL: CPT

## 2024-06-30 PROCEDURE — 96361 HYDRATE IV INFUSION ADD-ON: CPT

## 2024-06-30 PROCEDURE — 2500000004 HC RX 250 GENERAL PHARMACY W/ HCPCS (ALT 636 FOR OP/ED): Mod: SE

## 2024-06-30 PROCEDURE — 83690 ASSAY OF LIPASE: CPT

## 2024-06-30 PROCEDURE — 84075 ASSAY ALKALINE PHOSPHATASE: CPT

## 2024-06-30 PROCEDURE — 2500000004 HC RX 250 GENERAL PHARMACY W/ HCPCS (ALT 636 FOR OP/ED): Mod: SE | Performed by: EMERGENCY MEDICINE

## 2024-06-30 PROCEDURE — 84478 ASSAY OF TRIGLYCERIDES: CPT | Performed by: STUDENT IN AN ORGANIZED HEALTH CARE EDUCATION/TRAINING PROGRAM

## 2024-06-30 PROCEDURE — 96374 THER/PROPH/DIAG INJ IV PUSH: CPT | Mod: 59

## 2024-06-30 PROCEDURE — 96375 TX/PRO/DX INJ NEW DRUG ADDON: CPT

## 2024-06-30 PROCEDURE — 99231 SBSQ HOSP IP/OBS SF/LOW 25: CPT | Performed by: SURGERY

## 2024-06-30 PROCEDURE — 2550000001 HC RX 255 CONTRASTS: Mod: SE | Performed by: EMERGENCY MEDICINE

## 2024-06-30 PROCEDURE — 2500000001 HC RX 250 WO HCPCS SELF ADMINISTERED DRUGS (ALT 637 FOR MEDICARE OP): Performed by: SURGERY

## 2024-06-30 PROCEDURE — 74177 CT ABD & PELVIS W/CONTRAST: CPT | Performed by: RADIOLOGY

## 2024-06-30 PROCEDURE — 1100000001 HC PRIVATE ROOM DAILY

## 2024-06-30 PROCEDURE — 76705 ECHO EXAM OF ABDOMEN: CPT

## 2024-06-30 PROCEDURE — RXMED WILLOW AMBULATORY MEDICATION CHARGE

## 2024-06-30 PROCEDURE — 74177 CT ABD & PELVIS W/CONTRAST: CPT

## 2024-06-30 PROCEDURE — 36415 COLL VENOUS BLD VENIPUNCTURE: CPT

## 2024-06-30 PROCEDURE — 2500000004 HC RX 250 GENERAL PHARMACY W/ HCPCS (ALT 636 FOR OP/ED): Performed by: SURGERY

## 2024-06-30 PROCEDURE — 85025 COMPLETE CBC W/AUTO DIFF WBC: CPT

## 2024-06-30 RX ORDER — FENTANYL CITRATE 50 UG/ML
50 INJECTION, SOLUTION INTRAMUSCULAR; INTRAVENOUS ONCE
Status: COMPLETED | OUTPATIENT
Start: 2024-06-30 | End: 2024-06-30

## 2024-06-30 RX ORDER — POLYETHYLENE GLYCOL 3350 17 G/17G
17 POWDER, FOR SOLUTION ORAL DAILY
Status: DISPENSED | OUTPATIENT
Start: 2024-06-30

## 2024-06-30 RX ORDER — DULAGLUTIDE 1.5 MG/.5ML
1.5 INJECTION, SOLUTION SUBCUTANEOUS
Status: ON HOLD | COMMUNITY

## 2024-06-30 RX ORDER — CHOLECALCIFEROL (VITAMIN D3) 25 MCG
2000 TABLET ORAL DAILY
Status: DISPENSED | OUTPATIENT
Start: 2024-06-30

## 2024-06-30 RX ORDER — DEXTROSE 50 % IN WATER (D50W) INTRAVENOUS SYRINGE
25
Status: ACTIVE | OUTPATIENT
Start: 2024-06-30

## 2024-06-30 RX ORDER — DICYCLOMINE HYDROCHLORIDE 10 MG/5ML
20 SOLUTION ORAL
Status: ON HOLD | COMMUNITY
Start: 2024-06-21

## 2024-06-30 RX ORDER — POLYETHYLENE GLYCOL 3350 17 G/17G
17 POWDER, FOR SOLUTION ORAL DAILY PRN
Status: ON HOLD | COMMUNITY

## 2024-06-30 RX ORDER — HEPARIN SODIUM 5000 [USP'U]/ML
7500 INJECTION, SOLUTION INTRAVENOUS; SUBCUTANEOUS EVERY 8 HOURS SCHEDULED
Status: DISPENSED | OUTPATIENT
Start: 2024-06-30

## 2024-06-30 RX ORDER — INSULIN LISPRO 100 [IU]/ML
0-5 INJECTION, SOLUTION INTRAVENOUS; SUBCUTANEOUS
Status: DISPENSED | OUTPATIENT
Start: 2024-06-30

## 2024-06-30 RX ORDER — DEXTROSE 50 % IN WATER (D50W) INTRAVENOUS SYRINGE
12.5
Status: ACTIVE | OUTPATIENT
Start: 2024-06-30

## 2024-06-30 RX ORDER — METHOCARBAMOL 500 MG/1
500 TABLET, FILM COATED ORAL DAILY PRN
Status: ON HOLD | COMMUNITY

## 2024-06-30 RX ORDER — MORPHINE SULFATE 4 MG/ML
4 INJECTION INTRAVENOUS ONCE
Status: COMPLETED | OUTPATIENT
Start: 2024-06-30 | End: 2024-06-30

## 2024-06-30 RX ORDER — HYDROCHLOROTHIAZIDE 25 MG/1
12.5 TABLET ORAL DAILY
Status: DISPENSED | OUTPATIENT
Start: 2024-06-30

## 2024-06-30 RX ORDER — AMLODIPINE BESYLATE 10 MG/1
10 TABLET ORAL DAILY
Status: DISPENSED | OUTPATIENT
Start: 2024-06-30

## 2024-06-30 RX ORDER — DOCUSATE SODIUM 100 MG/1
100 CAPSULE, LIQUID FILLED ORAL 2 TIMES DAILY
Status: DISPENSED | OUTPATIENT
Start: 2024-06-30

## 2024-06-30 RX ORDER — BISACODYL 10 MG/1
10 SUPPOSITORY RECTAL ONCE
Status: COMPLETED | OUTPATIENT
Start: 2024-06-30 | End: 2024-06-30

## 2024-06-30 RX ORDER — ONDANSETRON HYDROCHLORIDE 2 MG/ML
4 INJECTION, SOLUTION INTRAVENOUS ONCE
Status: COMPLETED | OUTPATIENT
Start: 2024-06-30 | End: 2024-06-30

## 2024-06-30 RX ORDER — FLUTICASONE PROPIONATE 50 MCG
1 SPRAY, SUSPENSION (ML) NASAL DAILY
Status: ON HOLD | COMMUNITY

## 2024-06-30 RX ORDER — ACETAMINOPHEN 325 MG/1
975 TABLET ORAL ONCE
Status: DISCONTINUED | OUTPATIENT
Start: 2024-06-30 | End: 2024-06-30

## 2024-06-30 RX ORDER — ATORVASTATIN CALCIUM 20 MG/1
20 TABLET, FILM COATED ORAL NIGHTLY
Status: ACTIVE | OUTPATIENT
Start: 2024-06-30

## 2024-06-30 RX ORDER — SODIUM CHLORIDE, SODIUM LACTATE, POTASSIUM CHLORIDE, CALCIUM CHLORIDE 600; 310; 30; 20 MG/100ML; MG/100ML; MG/100ML; MG/100ML
40 INJECTION, SOLUTION INTRAVENOUS CONTINUOUS
Status: ACTIVE | OUTPATIENT
Start: 2024-06-30

## 2024-06-30 RX ADMIN — AMLODIPINE BESYLATE 10 MG: 10 TABLET ORAL at 13:33

## 2024-06-30 RX ADMIN — Medication 2000 UNITS: at 13:33

## 2024-06-30 RX ADMIN — SODIUM CHLORIDE, POTASSIUM CHLORIDE, SODIUM LACTATE AND CALCIUM CHLORIDE 1000 ML: 600; 310; 30; 20 INJECTION, SOLUTION INTRAVENOUS at 02:05

## 2024-06-30 RX ADMIN — HEPARIN SODIUM 7500 UNITS: 5000 INJECTION INTRAVENOUS; SUBCUTANEOUS at 14:03

## 2024-06-30 RX ADMIN — BISACODYL 10 MG: 10 SUPPOSITORY RECTAL at 14:08

## 2024-06-30 RX ADMIN — HYDROCHLOROTHIAZIDE 12.5 MG: 25 TABLET ORAL at 13:41

## 2024-06-30 RX ADMIN — POLYETHYLENE GLYCOL 3350 17 G: 17 POWDER, FOR SOLUTION ORAL at 13:42

## 2024-06-30 RX ADMIN — MORPHINE SULFATE 4 MG: 4 INJECTION, SOLUTION INTRAMUSCULAR; INTRAVENOUS at 08:19

## 2024-06-30 RX ADMIN — FENTANYL CITRATE 50 MCG: 50 INJECTION, SOLUTION INTRAMUSCULAR; INTRAVENOUS at 12:13

## 2024-06-30 RX ADMIN — ONDANSETRON 4 MG: 2 INJECTION INTRAMUSCULAR; INTRAVENOUS at 02:05

## 2024-06-30 RX ADMIN — IOHEXOL 80 ML: 350 INJECTION, SOLUTION INTRAVENOUS at 02:28

## 2024-06-30 RX ADMIN — SODIUM CHLORIDE, POTASSIUM CHLORIDE, SODIUM LACTATE AND CALCIUM CHLORIDE 40 ML/HR: 600; 310; 30; 20 INJECTION, SOLUTION INTRAVENOUS at 13:49

## 2024-06-30 RX ADMIN — DOCUSATE SODIUM 100 MG: 100 CAPSULE, LIQUID FILLED ORAL at 13:36

## 2024-06-30 SDOH — SOCIAL STABILITY: SOCIAL INSECURITY: DOES ANYONE TRY TO KEEP YOU FROM HAVING/CONTACTING OTHER FRIENDS OR DOING THINGS OUTSIDE YOUR HOME?: NO

## 2024-06-30 SDOH — SOCIAL STABILITY: SOCIAL INSECURITY: ABUSE: ADULT

## 2024-06-30 SDOH — SOCIAL STABILITY: SOCIAL INSECURITY: DO YOU FEEL ANYONE HAS EXPLOITED OR TAKEN ADVANTAGE OF YOU FINANCIALLY OR OF YOUR PERSONAL PROPERTY?: NO

## 2024-06-30 SDOH — SOCIAL STABILITY: SOCIAL INSECURITY: DO YOU FEEL UNSAFE GOING BACK TO THE PLACE WHERE YOU ARE LIVING?: NO

## 2024-06-30 SDOH — SOCIAL STABILITY: SOCIAL INSECURITY: HAVE YOU HAD ANY THOUGHTS OF HARMING ANYONE ELSE?: NO

## 2024-06-30 SDOH — SOCIAL STABILITY: SOCIAL INSECURITY: ARE YOU OR HAVE YOU BEEN THREATENED OR ABUSED PHYSICALLY, EMOTIONALLY, OR SEXUALLY BY ANYONE?: YES

## 2024-06-30 SDOH — SOCIAL STABILITY: SOCIAL INSECURITY: ARE THERE ANY APPARENT SIGNS OF INJURIES/BEHAVIORS THAT COULD BE RELATED TO ABUSE/NEGLECT?: NO

## 2024-06-30 SDOH — SOCIAL STABILITY: SOCIAL INSECURITY: WERE YOU ABLE TO COMPLETE ALL THE BEHAVIORAL HEALTH SCREENINGS?: YES

## 2024-06-30 SDOH — SOCIAL STABILITY: SOCIAL INSECURITY: HAVE YOU HAD THOUGHTS OF HARMING ANYONE ELSE?: NO

## 2024-06-30 SDOH — SOCIAL STABILITY: SOCIAL INSECURITY: HAS ANYONE EVER THREATENED TO HURT YOUR FAMILY OR YOUR PETS?: YES

## 2024-06-30 ASSESSMENT — ACTIVITIES OF DAILY LIVING (ADL)
WALKS IN HOME: INDEPENDENT
TOILETING: INDEPENDENT
ADEQUATE_TO_COMPLETE_ADL: YES
PATIENT'S MEMORY ADEQUATE TO SAFELY COMPLETE DAILY ACTIVITIES?: YES
LACK_OF_TRANSPORTATION: YES
DRESSING YOURSELF: INDEPENDENT
FEEDING YOURSELF: INDEPENDENT
BATHING: INDEPENDENT
JUDGMENT_ADEQUATE_SAFELY_COMPLETE_DAILY_ACTIVITIES: YES
HEARING - RIGHT EAR: FUNCTIONAL
HEARING - LEFT EAR: FUNCTIONAL
GROOMING: INDEPENDENT

## 2024-06-30 ASSESSMENT — COGNITIVE AND FUNCTIONAL STATUS - GENERAL
DAILY ACTIVITIY SCORE: 24
PATIENT BASELINE BEDBOUND: NO
MOBILITY SCORE: 24

## 2024-06-30 ASSESSMENT — LIFESTYLE VARIABLES
SUBSTANCE_ABUSE_PAST_12_MONTHS: NO
SKIP TO QUESTIONS 9-10: 0
HOW OFTEN DO YOU HAVE A DRINK CONTAINING ALCOHOL: MONTHLY OR LESS
AUDIT-C TOTAL SCORE: 2
HOW OFTEN DO YOU HAVE 6 OR MORE DRINKS ON ONE OCCASION: LESS THAN MONTHLY
PRESCIPTION_ABUSE_PAST_12_MONTHS: NO
HOW MANY STANDARD DRINKS CONTAINING ALCOHOL DO YOU HAVE ON A TYPICAL DAY: 1 OR 2
AUDIT-C TOTAL SCORE: 2

## 2024-06-30 ASSESSMENT — PAIN SCALES - GENERAL
PAINLEVEL_OUTOF10: 3
PAINLEVEL_OUTOF10: 9
PAINLEVEL_OUTOF10: 9
PAINLEVEL_OUTOF10: 0 - NO PAIN

## 2024-06-30 ASSESSMENT — PATIENT HEALTH QUESTIONNAIRE - PHQ9
SUM OF ALL RESPONSES TO PHQ9 QUESTIONS 1 & 2: 0
2. FEELING DOWN, DEPRESSED OR HOPELESS: NOT AT ALL
1. LITTLE INTEREST OR PLEASURE IN DOING THINGS: NOT AT ALL

## 2024-06-30 ASSESSMENT — PAIN DESCRIPTION - LOCATION: LOCATION: ABDOMEN

## 2024-06-30 NOTE — H&P
[facility]  ACUTE CARE SURGERY - HISTORY AND PHYSICAL / CONSULT    Patient Name: Felicia Hartman  MRN: [id number]  Admit Date: 629  : 1970  AGE: 53 y.o.   GENDER: female  ==============================================================================  TODAY'S ASSESSMENT AND PLAN OF CARE:  Felicia Hartman is a 53 y.o. year old female patient with a past medical history of hypertension, hyperlipemia, DM2, CKD II, previous SBO, left inguinal hernia repair with mesh, exploratory laparotomy and small bowel resection and lysis of adhesions x 2 who presented with complaints of inability to have a bowel movement. She does have a history of multiple previous abdominal surgeries and SBO with known ventral wall hernias. Her labs are notable for elevated lipase to 416. Her CT scan does show loops of bowl within the hernias, though these do not appear to be significantly dilated and no transition point is noted. Also noted to have fat stranding at the pancreas, concerning for possible pancreatitis. She is passing gas and had no addition obstructive symptoms such as nausea or emesis, making bowel obstruction less likely. She does have a stool burden in the rectum on her CT, and in combination with her use of bentyl her symptoms are more likely secondary to constipation.     Elevated Lipase on labs in combination with fat stranding of head and body of pancreas concerning for acute pancreatitis. No abdominal pain. Source of pancreatitis unclear at this time- gallstones vs alcoholic vs hypertriglyceridemia vs other.    Plan:  - admit to observation under general surgery  - ok for diabetic diet  - lactated ringers @ 40cc/hr  - RUQ US  - triglycerides  - AM labs  - bowel regimen- senna, docusate, bisacodyl  - home meds  - low sliding scale insulin    Plans discussed with attending physician Dr. Estevez.    Lisa Craig MD  Acute Care Surgery  [id number]    ==============================================================================  CHIEF COMPLAINT/REASON FOR CONSULT:  Felicia Hartman is a 53 y.o. year old female patient with a past medical history of hypertension, hyperlipemia, DM2, CKD II, previous SBO, left inguinal hernia repair with mesh, exploratory laparotomy and small bowel resection and lysis of adhesions x 2 presenting today with complaints of inability to have a bowel movement. Last Bm was three days ago, she has been passing gas consistently. She tried miralax and and enema at home without success. Denies nausea, has one episode of emesis after receiving contrast for her CT scan but has had no other episodes. No fever, chills, chest pain, shortness of breath or abdominal pain. Her symptoms do not feel similar to her previous bowel obstructions. She has not noted pain at any of her ventral wall hernia sites, does not feel that any of the hernias are firm of differ from their baseline. She was recently see in the ED for diarrhea 6/9, found to have campylobacter. Was seen by her PCP after, who started her on Bentyl, states that her constipation symptoms started after she began taking the Bentyl.    In the emergency department, she is afebrile, hemodynamically stable, non-tachycardic and normotensive. No leukocytosis on labs, lipase elevated to 416. CT of the abdomen and pelvis notable for multiple abdominal wall hernias containing bowel with gas and stool in the bowel. No transition point observed. Also noted to have mild soft tissue stranding at the pancreatic body and tail, concerning for possible pancreatitis. General surgery consulted for additional recommendations.       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    Borderline high cholesterol    Personal history of other diseases of the circulatory system 07/22/2019    History of hypertension     PSH:    Past Surgical History:   Procedure Laterality Date    BREAST BIOPSY Bilateral     OTHER SURGICAL HISTORY  2019    Hernia repair    OTHER SURGICAL HISTORY  2019    Foot surgery    OTHER SURGICAL HISTORY  2019     section    OTHER SURGICAL HISTORY  2019    Adhesiolysis     FH:   Family History   Problem Relation Name Age of Onset    Breast cancer Mother  70     SOCIAL HISTORY:    Smoking:    Social History     Tobacco Use   Smoking Status Former    Types: Cigarettes   Smokeless Tobacco Never       Alcohol:    Social History     Substance and Sexual Activity   Alcohol Use Never          MEDICATIONS:   Prior to Admission medications    Medication Sig Start Date End Date Taking? Authorizing Provider   amLODIPine (Norvasc) 10 mg tablet Take 1 tablet (10 mg) by mouth once daily. 19   Historical Provider, MD   atorvastatin (Lipitor) 20 mg tablet Take 1 tablet (20 mg) by mouth once daily at bedtime. 19   Historical Provider, MD   bisacodyl (Dulcolax) 5 mg EC tablet Take 1 tablet (5 mg) by mouth 2 times a day for 5 days. Do not crush, chew, or split. 24  Prudence Moyer DO   cholecalciferol (Vitamin D-3) 50 MCG (2000 UT) tablet Take 1 tablet (50 mcg) by mouth once daily. 19   Historical Provider, MD   hydroCHLOROthiazide (Microzide) 12.5 mg capsule Take 1 capsule (12.5 mg) by mouth once daily. 19   Historical Provider, MD   metFORMIN, OSM, (Fortamet) 500 mg 24 hr tablet Do not crush, chew, or split.    Historical Provider, MD   polyethylene glycol (Glycolax, Miralax) 17 gram packet Take 17 g by mouth once daily for 3 days. 24  Prudence Moyer DO     ALLERGIES:   Allergies   Allergen Reactions    Augmentin [Amoxicillin-Pot Clavulanate] Unknown    Ibuprofen Unknown    Lactose Unknown    Lisinopril Unknown    Nickel Unknown    Gabapentin Anxiety       REVIEW OF SYSTEMS:  Review of Systems    A 10-point ROS was conducted, negative except as noted  in the HPI.     PHYSICAL EXAM:  Physical Exam  Constitutional:       General: She is not in acute distress.     Appearance: Normal appearance. She is obese.   Eyes:      Extraocular Movements: Extraocular movements intact.      Conjunctiva/sclera: Conjunctivae normal.   Cardiovascular:      Rate and Rhythm: Normal rate and regular rhythm.      Pulses: Normal pulses.   Pulmonary:      Effort: Pulmonary effort is normal. No respiratory distress.   Abdominal:      General: There is no distension.      Palpations: Abdomen is soft.      Tenderness: There is no abdominal tenderness. There is no guarding or rebound.      Comments: No obvious masses, hernias soft. Well healed midline scar   Musculoskeletal:         General: No swelling.      Cervical back: Neck supple.   Skin:     General: Skin is warm and dry.   Neurological:      General: No focal deficit present.      Mental Status: She is alert and oriented to person, place, and time.   Psychiatric:         Mood and Affect: Mood normal.         Behavior: Behavior normal.       IMAGING SUMMARY:    US gallbladder   Final Result   Gallbladder sludge without evidence of cholelithiasis, cholecystitis   or biliary dilation.        MACRO:   None             Signed by: Jas Espinoza 6/30/2024 9:13 AM   Dictation workstation:   [id number]      CT abdomen pelvis w IV contrast   Final Result   1. Multiple ventral abdominal wall hernias and postsurgical changes   from small bowel resections with anastomoses in the mid and lower   abdomen. There is dilatation and fecalization of both anastomotic   loops of small bowel with upstream dilatation of the upper abdominal   anastomotic loop which protrudes through the superior most ventral   abdominal wall hernia and involvement of the lower abdominal   anastomotic loop in the lower ventral abdominal wall hernia. Multiple   nondilated gas and stool filled loops of large bowel are involved in   the hernias as above. A component of  developing obstruction can not   be excluded.   2. Mild soft tissue stranding at the pancreatic body and tail, may be   secondary to motion artifact versus acute pancreatitis. Please   correlate with lipase level.   3. Hepatomegaly.   4. Left nephrolithiasis.   5. Colonic diverticulosis.                            I personally reviewed the images/study and I agree with the findings   as stated.        MACRO:   Chanel Beasley discussed the significance and urgency of this   critical finding by telephone with  Dr. Castellon on 6/30/2024 at 5:04   am.  (**-RCF-**) Findings:  See findings.                  Signed by: Chanel Beasley 6/30/2024 5:08 AM   Dictation workstation:   [id number]      XR abdomen 1 view   Final Result        Moderate amount of stool throughout the colon but without a   radiographic findings suggesting large amount in the rectum.        Bowel-gas pattern nonobstructive.        Signed by: Jorge Armstrong 6/29/2024 11:07 PM   Dictation workstation:   [id number]            LABS:  Results from last 7 days   Lab Units 06/30/24  0112   WBC AUTO x10*3/uL 7.7   HEMOGLOBIN g/dL 12.4   HEMATOCRIT % 37.5   PLATELETS AUTO x10*3/uL 358   NEUTROS PCT AUTO % 62.7   LYMPHS PCT AUTO % 28.9   MONOS PCT AUTO % 6.4   EOS PCT AUTO % 1.4         Results from last 7 days   Lab Units 06/30/24  0112   SODIUM mmol/L 140   POTASSIUM mmol/L 4.0   CHLORIDE mmol/L 104   CO2 mmol/L 25   BUN mg/dL 10   CREATININE mg/dL 1.11*   CALCIUM mg/dL 9.6   PROTEIN TOTAL g/dL 8.4*   BILIRUBIN TOTAL mg/dL 0.3   ALK PHOS U/L 78   ALT U/L 9   AST U/L 8*   GLUCOSE mg/dL 143*     Results from last 7 days   Lab Units 06/30/24  0112   BILIRUBIN TOTAL mg/dL 0.3             I have reviewed all laboratory and imaging results ordered/pertinent for this encounter.

## 2024-06-30 NOTE — ED PROVIDER NOTES
CC: Constipation (Been 3 days since had a B.M)     History provided by: Patient  Limitations to History: None    HPI:  Patient is a 53-year-old female with history of hypertension, type 2 diabetes, CKD, obesity, history of partial SBO secondary to abdominal wall hernias, bilateral salpingectomy, left sided oophorectomy in May 2023.  She presents with constipation ongoing for the past 3 days.  She states she is passing gas and denies any nausea, vomiting, abdominal pain, fevers, chills, chest pain, shortness of breath.  She states she used MiraLAX and an enema at home earlier today without relief.  She states this does not feel like when she had a bowel obstruction in the past.  She is tolerating p.o.  She states she has been taking Bentyl since she was seen in the ED for gastroenteritis approximately 10 days ago.  She stopped taking Bentyl recently because she thought it was contributing to constipation.    I reviewed discharge summary from September 2023 when patient was admitted for surgical site infection to ACS service.    External Records Reviewed:  I reviewed prior ED visits, Care Everywhere, discharge summaries and outpatient records as appropriate.   ???????????????????????????????????????????????????????????????  Triage Vitals:  T 35.2 °C (95.3 °F)  HR (!) 104  /79  RR 17  O2 99 % None (Room air)    Physical Exam  Vitals and nursing note reviewed.   Constitutional:       General: She is not in acute distress.     Appearance: Normal appearance.   HENT:      Head: Normocephalic and atraumatic.   Eyes:      Conjunctiva/sclera: Conjunctivae normal.   Cardiovascular:      Rate and Rhythm: Normal rate and regular rhythm.   Pulmonary:      Effort: Pulmonary effort is normal. No respiratory distress.      Breath sounds: Normal breath sounds.   Abdominal:      General: Abdomen is flat. There is no distension.      Palpations: Abdomen is soft.      Tenderness: There is no abdominal tenderness. There is no  right CVA tenderness, left CVA tenderness, guarding or rebound.   Musculoskeletal:         General: Normal range of motion.      Cervical back: Normal range of motion and neck supple.   Skin:     General: Skin is warm and dry.   Neurological:      General: No focal deficit present.      Mental Status: She is alert and oriented to person, place, and time. Mental status is at baseline.   Psychiatric:         Mood and Affect: Mood normal.         Behavior: Behavior normal.        ???????????????????????????????????????????????????????????????  ED Course/Treatment/Medical Decision Making  MDM:  Patient is a 53-year-old female who presents with constipation.  Vital signs notable for slight tachycardia however she does not appear septic, does not appear peritonitic.  Abdominal exam overall unremarkable, nondistended, no rebound, no guarding or tenderness.  Differential diagnoses considered include constipation, bowel obstruction, intra-abdominal infection.  In the absence of focal tenderness, fevers have low suspicion for acute intra-abdominal infection.  Though patient has a history of bowel obstruction, she is passing gas and denies any nausea, vomiting, abdomen is nondistended as well therefore I have low suspicion.  Additionally, patient is tolerating p.o.  I did obtain KUB for initial evaluation.  I do not believe CT imaging or labs are warranted at this time.      ED Course:  ED Course as of 06/30/24 0154   Sat Jun 29, 2024   2312 I independently reviewed KUB, no obvious stool burden in rectum, patient does have moderate to large amount of stool burden in her colon.  Patient is agreeable to trialing enema in the emergency department.  I discussed with patient that if enema is unsuccessful then we would likely proceed with CT imaging and labs given her extensive history of abdominal surgeries.  She is agreeable with plan. [SA]   Sun Jun 30, 2024   0018 Enema unsuccessful [SA]   0033 I did send prescriptions for  MiraLAX and Dulcolax to patient's pharmacy in the event that her workup is negative and she notes improvement in symptoms after signout [SA]      ED Course User Index  [SA] Prudence Moyer DO         Diagnoses as of 06/30/24 0154   Constipation, unspecified constipation type       Scoring Tools Utilized: None    EKG Interpretation:  See ED Course/Below:    Independent Interpretation of Studies:  I independently interpreted labs/imaging as stated in ED Course or below.    Differential diagnoses considered include but are not limited to: See MDM/Below:    Social Determinants Limiting Care:  None identified The following actions were taken to address these social determinants: None    Discussion of Management with Other Providers: See MDM/Below:    Disposition:  .Patient was signed out to Dr. Castellon at 0200 pending completion of their work-up.  Please see the next provider's transition of care note for the remainder of the patient's care.       RUBY Boles, PGY-2    I reviewed the case with the attending ED physician. The attending ED physician agrees with the plan. Patient and/or patient´s representative was counseled regarding labs, imaging, likely diagnosis, and plan. All questions were answered.    Disclaimer: This note was dictated by speech recognition.  Attempt at proofreading was made to minimize errors.  Errors in transcription may be present.  Please call if questions.    Procedures ? SmartLinks last updated 6/30/2024 1:54 AM        Prudence Moyer DO  Resident  06/30/24 0154

## 2024-06-30 NOTE — DISCHARGE INSTRUCTIONS
Please follow-up with your primary care physician within 3 to 5 days to discuss your ED visit.  If you develop worsening abdominal pain, fevers, chills, inability to pass gas, severe constipation, nausea, vomiting, inability to eat please return to the ED. I did send MiraLAX and Dulcolax to your pharmacy that was listed in our EMR.  Please take these as prescribed to help with constipation.

## 2024-06-30 NOTE — PROGRESS NOTES
"Transition of care note:    ED Course as of 06/30/24 0607   Sat Jun 29, 2024   2312 I independently reviewed KUB, no obvious stool burden in rectum, patient does have moderate to large amount of stool burden in her colon.  Patient is agreeable to trialing enema in the emergency department.  I discussed with patient that if enema is unsuccessful then we would likely proceed with CT imaging and labs given her extensive history of abdominal surgeries.  She is agreeable with plan. [SA]   Sun Jun 30, 2024   0018 Enema unsuccessful [SA]   0033 I did send prescriptions for MiraLAX and Dulcolax to patient's pharmacy in the event that her workup is negative and she notes improvement in symptoms after signout [SA]   0602 Radiology called, recommended lipase level is there is concern for possible pancreatitis.  Lipase is elevated, will admit the patient to the hospital for further management. [RD]   0607 There is concern for bowel obstruction as well, I did page acute care surgery.  Patient to oncoming provider pending acute care surgery recommendations and admission. [RD]      ED Course User Index  [RD] Samuel Castellon DO  [SA] Prudence Moyer DO         Diagnoses as of 06/30/24 0607   Constipation, unspecified constipation type      Visit Vitals  /81 (BP Location: Right arm, Patient Position: Sitting)   Pulse 73   Temp 35.2 °C (95.3 °F) (Temporal)   Resp 18   Ht 1.753 m (5' 9\")   Wt 132 kg (290 lb)   SpO2 99%   BMI 42.83 kg/m²   OB Status Perimenopausal   Smoking Status Former   BSA 2.54 m²      Procedures   " BMP/CBC

## 2024-06-30 NOTE — PROGRESS NOTES
I received Felicia Hartman in signout from Dr. Castellon.  Please see the previous note for all HPI, PE and MDM up to the time of signout at 0700.  This is in addition to the primary documentation.    In brief Felicia Hartman is an 53 y.o. female with PMHx HTN, T2DM, CKD, history of partial SBO secondary to abdominal wall hernias, bilateral salpingectomy, left-sided nephrectomy (2023) presenting for constipation x 3 days.  CTAP shows multiple abdominal wall hernias and was unable to exclude an SBO, also showed concern for pancreatitis.     Chief Complaint   Patient presents with    Constipation     Been 3 days since had a B.M      At the time of signout we were awaiting: ACS evaluation and recs    MDM:  Lipase elevated, which in combination with CT findings suggests pancreatitis.  Patient was s/p 1 L bolus LR, and was treated with 4 mg morphine prior to RUQ US. RUQ US with gallbladder sludge, no cholelithiasis or cholecystitis.    Surgery evaluated patient and recommended admission to ACS for observation given her complex abdominal surgical history and inability to rule out bowel obstruction on CT.  Discussed with the patient who was in agreement with admission.  Patient admitted to ACS service in stable condition.     Assessment and plan discussed with Dr. Thorpe    Disposition: Admit to ACS    Martha Biggs MD   Emergency Medicine, PGY-1

## 2024-06-30 NOTE — PROGRESS NOTES
Pharmacy Medication History Review    Felicia Hartman is a 53 y.o. female admitted for Pancreatitis, unspecified pancreatitis type ([facility]). Pharmacy reviewed the patient's [id number] medications and allergies for accuracy.    The list below reflectives the updated PTA list. Please review each medication in order reconciliation for additional clarification and justification.  Prior to Admission Medications   Prescriptions Last Dose Informant Patient Reported? Taking?   amLODIPine (Norvasc) 10 mg tablet 6/30/2024  Yes Yes   Sig: Take 1 tablet (10 mg) by mouth once daily.   atorvastatin (Lipitor) 40 mg tablet 6/30/2024  Yes Yes   Sig: Take 1 tablet (40 mg) by mouth once daily at bedtime.   cholecalciferol (Vitamin D-3) 50 MCG (2000 UT) tablet 6/30/2024  Yes Yes   Sig: Take 1 tablet (2,000 Units) by mouth once daily.   dicyclomine (Bentyl) 10 mg/5 mL syrup 6/29/2024  Yes Yes   Sig: Take 10 mL (20 mg) by mouth 4 times a day before meals.   dulaglutide (Trulicity) 1.5 mg/0.5 mL pen injector injection 6/23/2024  Yes Yes   Sig: Inject 1.5 mg under the skin 1 (one) time per week.   fluticasone (Flonase) 50 mcg/actuation nasal spray 6/29/2024  Yes Yes   Sig: Administer 1 spray into each nostril once daily. Shake gently. Before first use, prime pump. After use, clean tip and replace cap.   hydroCHLOROthiazide (HYDRODiuril) 25 mg tablet 6/30/2024  Yes Yes   Sig: Take 1 tablet (25 mg) by mouth once daily in the morning.   metFORMIN XR (Glucophage-XR) 500 mg 24 hr tablet 6/29/2024  Yes Yes   Sig: Take 2 tablets (1,000 mg) by mouth once daily in the evening. Take with meals. Do not crush, chew, or split.   methocarbamol (Robaxin) 500 mg tablet 6/8/2024  Yes No   Sig: Take 1 tablet (500 mg) by mouth once daily as needed for muscle spasms.   polyethylene glycol (Glycolax, Miralax) 17 gram packet 6/29/2024  Yes Yes   Sig: Take 17 g by mouth once daily as needed (constiption).      Facility-Administered Medications: None          The list below reflectives the updated allergy list. Please review each documented allergy for additional clarification and justification.  Allergies  Reviewed by Deanna Tobias, DollyD on 6/30/2024        Severity Reactions Comments    Augmentin [amoxicillin-pot Clavulanate] Medium Nausea/vomiting     Ibuprofen Not Specified Other Kidney injury    Lactose Not Specified GI Upset     Lisinopril Not Specified Unknown Acute kidney injury    Gabapentin Low Anxiety     Nickel Low Rash, Bleeding Hair and teeth loss, abdominal pain            Below are additional concerns with the patient's PTA list.  Clarified medications with patient, pharmacy fill history and [facility] internal medicine notes. Patient knowledgeable about medications and endorses compliance.    She accepts M2B at discharge.    Deanna Tobias, DollyD, BCCCP

## 2024-07-01 LAB
ANION GAP SERPL CALC-SCNC: 14 MMOL/L (ref 10–20)
BUN SERPL-MCNC: 10 MG/DL (ref 6–23)
CALCIUM SERPL-MCNC: 8.5 MG/DL (ref 8.6–10.6)
CHLORIDE SERPL-SCNC: 105 MMOL/L (ref 98–107)
CO2 SERPL-SCNC: 23 MMOL/L (ref 21–32)
CREAT SERPL-MCNC: 0.95 MG/DL (ref 0.5–1.05)
EGFRCR SERPLBLD CKD-EPI 2021: 71 ML/MIN/1.73M*2
ERYTHROCYTE [DISTWIDTH] IN BLOOD BY AUTOMATED COUNT: 13.5 % (ref 11.5–14.5)
GLUCOSE BLD MANUAL STRIP-MCNC: 136 MG/DL (ref 74–99)
GLUCOSE BLD MANUAL STRIP-MCNC: 149 MG/DL (ref 74–99)
GLUCOSE BLD MANUAL STRIP-MCNC: 159 MG/DL (ref 74–99)
GLUCOSE BLD MANUAL STRIP-MCNC: 166 MG/DL (ref 74–99)
GLUCOSE SERPL-MCNC: 103 MG/DL (ref 74–99)
HCT VFR BLD AUTO: 33.1 % (ref 36–46)
HGB BLD-MCNC: 11 G/DL (ref 12–16)
LIPASE SERPL-CCNC: 281 U/L (ref 9–82)
MAGNESIUM SERPL-MCNC: 2.45 MG/DL (ref 1.6–2.4)
MCH RBC QN AUTO: 27.8 PG (ref 26–34)
MCHC RBC AUTO-ENTMCNC: 33.2 G/DL (ref 32–36)
MCV RBC AUTO: 84 FL (ref 80–100)
NRBC BLD-RTO: 0 /100 WBCS (ref 0–0)
PHOSPHATE SERPL-MCNC: 4.1 MG/DL (ref 2.5–4.9)
PLATELET # BLD AUTO: 270 X10*3/UL (ref 150–450)
POTASSIUM SERPL-SCNC: 4.3 MMOL/L (ref 3.5–5.3)
RBC # BLD AUTO: 3.96 X10*6/UL (ref 4–5.2)
SODIUM SERPL-SCNC: 138 MMOL/L (ref 136–145)
WBC # BLD AUTO: 5.8 X10*3/UL (ref 4.4–11.3)

## 2024-07-01 PROCEDURE — 2500000005 HC RX 250 GENERAL PHARMACY W/O HCPCS: Performed by: SURGERY

## 2024-07-01 PROCEDURE — 2500000004 HC RX 250 GENERAL PHARMACY W/ HCPCS (ALT 636 FOR OP/ED): Performed by: SURGERY

## 2024-07-01 PROCEDURE — 83735 ASSAY OF MAGNESIUM: CPT | Performed by: SURGERY

## 2024-07-01 PROCEDURE — 82947 ASSAY GLUCOSE BLOOD QUANT: CPT

## 2024-07-01 PROCEDURE — 1100000001 HC PRIVATE ROOM DAILY

## 2024-07-01 PROCEDURE — 85027 COMPLETE CBC AUTOMATED: CPT | Performed by: SURGERY

## 2024-07-01 PROCEDURE — 2500000001 HC RX 250 WO HCPCS SELF ADMINISTERED DRUGS (ALT 637 FOR MEDICARE OP): Performed by: SURGERY

## 2024-07-01 PROCEDURE — 80048 BASIC METABOLIC PNL TOTAL CA: CPT | Performed by: SURGERY

## 2024-07-01 PROCEDURE — 2500000004 HC RX 250 GENERAL PHARMACY W/ HCPCS (ALT 636 FOR OP/ED)

## 2024-07-01 PROCEDURE — 99231 SBSQ HOSP IP/OBS SF/LOW 25: CPT | Performed by: SURGERY

## 2024-07-01 PROCEDURE — 83690 ASSAY OF LIPASE: CPT | Performed by: SURGERY

## 2024-07-01 PROCEDURE — 84100 ASSAY OF PHOSPHORUS: CPT | Performed by: SURGERY

## 2024-07-01 PROCEDURE — 36415 COLL VENOUS BLD VENIPUNCTURE: CPT | Performed by: SURGERY

## 2024-07-01 PROCEDURE — 2500000002 HC RX 250 W HCPCS SELF ADMINISTERED DRUGS (ALT 637 FOR MEDICARE OP, ALT 636 FOR OP/ED): Performed by: SURGERY

## 2024-07-01 RX ORDER — ACETAMINOPHEN 325 MG/1
650 TABLET ORAL 4 TIMES DAILY
Status: DISCONTINUED | OUTPATIENT
Start: 2024-07-01 | End: 2024-07-02 | Stop reason: HOSPADM

## 2024-07-01 RX ORDER — BISACODYL 10 MG/1
10 SUPPOSITORY RECTAL DAILY
Status: DISCONTINUED | OUTPATIENT
Start: 2024-07-01 | End: 2024-07-02 | Stop reason: HOSPADM

## 2024-07-01 RX ORDER — HYDROMORPHONE HYDROCHLORIDE 1 MG/ML
0.2 INJECTION, SOLUTION INTRAMUSCULAR; INTRAVENOUS; SUBCUTANEOUS ONCE
Status: COMPLETED | OUTPATIENT
Start: 2024-07-01 | End: 2024-07-01

## 2024-07-01 RX ADMIN — ACETAMINOPHEN 650 MG: 325 TABLET ORAL at 08:47

## 2024-07-01 RX ADMIN — HEPARIN SODIUM 7500 UNITS: 5000 INJECTION INTRAVENOUS; SUBCUTANEOUS at 17:58

## 2024-07-01 RX ADMIN — ATORVASTATIN CALCIUM 20 MG: 20 TABLET, FILM COATED ORAL at 20:18

## 2024-07-01 RX ADMIN — HEPARIN SODIUM 7500 UNITS: 5000 INJECTION INTRAVENOUS; SUBCUTANEOUS at 08:38

## 2024-07-01 RX ADMIN — AMLODIPINE BESYLATE 10 MG: 10 TABLET ORAL at 08:39

## 2024-07-01 RX ADMIN — BISACODYL 10 MG: 10 SUPPOSITORY RECTAL at 08:39

## 2024-07-01 RX ADMIN — ACETAMINOPHEN 650 MG: 325 TABLET ORAL at 17:58

## 2024-07-01 RX ADMIN — POLYETHYLENE GLYCOL 3350 17 G: 17 POWDER, FOR SOLUTION ORAL at 08:37

## 2024-07-01 RX ADMIN — Medication 2000 UNITS: at 08:39

## 2024-07-01 RX ADMIN — DOCUSATE SODIUM 100 MG: 100 CAPSULE, LIQUID FILLED ORAL at 01:13

## 2024-07-01 RX ADMIN — DOCUSATE SODIUM 100 MG: 100 CAPSULE, LIQUID FILLED ORAL at 20:18

## 2024-07-01 RX ADMIN — HYDROMORPHONE HYDROCHLORIDE 0.2 MG: 1 INJECTION, SOLUTION INTRAMUSCULAR; INTRAVENOUS; SUBCUTANEOUS at 02:05

## 2024-07-01 RX ADMIN — ACETAMINOPHEN 650 MG: 325 TABLET ORAL at 12:02

## 2024-07-01 RX ADMIN — DOCUSATE SODIUM 100 MG: 100 CAPSULE, LIQUID FILLED ORAL at 08:39

## 2024-07-01 RX ADMIN — HYDROCHLOROTHIAZIDE 12.5 MG: 25 TABLET ORAL at 08:38

## 2024-07-01 RX ADMIN — HEPARIN SODIUM 7500 UNITS: 5000 INJECTION INTRAVENOUS; SUBCUTANEOUS at 01:13

## 2024-07-01 RX ADMIN — SODIUM PHOSPHATE 1 ENEMA: 7; 19 ENEMA RECTAL at 08:39

## 2024-07-01 RX ADMIN — ATORVASTATIN CALCIUM 20 MG: 20 TABLET, FILM COATED ORAL at 01:23

## 2024-07-01 RX ADMIN — INSULIN LISPRO 1 UNITS: 100 INJECTION, SOLUTION INTRAVENOUS; SUBCUTANEOUS at 17:00

## 2024-07-01 RX ADMIN — SODIUM CHLORIDE, POTASSIUM CHLORIDE, SODIUM LACTATE AND CALCIUM CHLORIDE 40 ML/HR: 600; 310; 30; 20 INJECTION, SOLUTION INTRAVENOUS at 11:44

## 2024-07-01 ASSESSMENT — COGNITIVE AND FUNCTIONAL STATUS - GENERAL
WALKING IN HOSPITAL ROOM: A LITTLE
MOBILITY SCORE: 24
DAILY ACTIVITIY SCORE: 24
DAILY ACTIVITIY SCORE: 24
MOBILITY SCORE: 21
CLIMB 3 TO 5 STEPS WITH RAILING: A LOT

## 2024-07-01 ASSESSMENT — PAIN SCALES - GENERAL
PAINLEVEL_OUTOF10: 0 - NO PAIN
PAINLEVEL_OUTOF10: 8
PAINLEVEL_OUTOF10: 5 - MODERATE PAIN

## 2024-07-01 ASSESSMENT — PAIN DESCRIPTION - LOCATION: LOCATION: RECTUM

## 2024-07-01 ASSESSMENT — PAIN - FUNCTIONAL ASSESSMENT
PAIN_FUNCTIONAL_ASSESSMENT: 0-10
PAIN_FUNCTIONAL_ASSESSMENT: UNABLE TO SELF-REPORT

## 2024-07-01 ASSESSMENT — PAIN DESCRIPTION - DESCRIPTORS: DESCRIPTORS: ACHING;DISCOMFORT;DULL

## 2024-07-01 NOTE — PROGRESS NOTES
07/01/24 1008   Discharge Planning   Living Arrangements Spouse/significant other  ([place] - spouse)   Support Systems Spouse/significant other  (spouse - [place])   Assistance Needed none at this time   Type of Residence Private residence   Number of Stairs to Enter Residence 1   Number of Stairs Within Residence 15  (series of 4, 5, 6)   Do you have animals or pets at home? Yes   Type of Animals or Pets x1 dog   Who is requesting discharge planning? Provider   Home or Post Acute Services None   Patient expects to be discharged to: home - needs to be determined   Does the patient need discharge transport arranged? No       53 yr old female, PMH: hypertension, hyperlipemia, DM2, CKD II, previous SBO, left inguinal hernia repair with mesh, exploratory laparotomy and small bowel resection and lysis of adhesions x 2. Admitted with c/o of inability to have a bowel movement.     Met w/ pt to introduce myself, discuss role & discharge planning. Denies recent falls. Is independent in ADL's. No mobility assistance. Feels safe at home. No SW needs at this time.     Home care: needs to be determined, has no preference  PCP: Dr. Gracia Boothe  Pharmacy: [facility] (inpatient meds), [facility] - [address]  DME: N/A    This TCC will continue to follow for home going needs and safe DC plan.     Lucy Oconnor RN

## 2024-07-01 NOTE — SIGNIFICANT EVENT
Chief complaint: rectal pain; received page from RN that patient had pain and no PRNs for pain mgmt were available.    Subjective:   Pt has 8/10 sharp, intermittent rectal pain. States that she had 1x BM today. She states that she is passing flatus. Denies abdominal pain.     Objective:     Visit Vitals  /83 (BP Location: Left arm, Patient Position: Lying)   Pulse 62   Temp 36.1 °C (97 °F) (Temporal)   Resp 18     Physical Exam:  General: Resting comfortably in bed.   GI: Abdomen is soft, protuberant, nontender.     Assessment:  Rectal pain appropriate and likely 2/2 constipation.     Plan:   Ordered a 1x dilaudid PRN. Scheduled tylenol. Will defer to day team regarding further adjustment of pain regimen in setting of constipation.     Patient discussed with chief Park Aleman MD, PGY4

## 2024-07-01 NOTE — CARE PLAN
Problem: Pain  Goal: Takes deep breaths with improved pain control throughout the shift  Outcome: Progressing  Goal: Turns in bed with improved pain control throughout the shift  Outcome: Progressing  Goal: Walks with improved pain control throughout the shift  Outcome: Progressing  Goal: Performs ADL's with improved pain control throughout shift  Outcome: Progressing  Goal: Participates in PT with improved pain control throughout the shift  Outcome: Progressing  Goal: Free from opioid side effects throughout the shift  Outcome: Progressing  Goal: Free from acute confusion related to pain meds throughout the shift  Outcome: Progressing   The patient's goals for the shift include remain safe throughout this shift    The clinical goals for the shift include decrease pain level

## 2024-07-01 NOTE — PROGRESS NOTES
[facility]  ACUTE CARE SURGERY - PROGRESS NOTE    Patient Name: Felicia Hartman  MRN: [id number]  Admit Date: 629  : 1970  AGE: 53 y.o.   GENDER: female  ==============================================================================  TODAY'S ASSESSMENT AND PLAN OF CARE:  Felicia Hartman is a 53 y.o. year old female patient with a past medical history of hypertension, hyperlipemia, DM2, CKD II, previous SBO, left inguinal hernia repair with mesh, exploratory laparotomy and small bowel resection and lysis of adhesions x 2 who presented with complaints of inability to have a bowel movement. She does have a history of multiple previous abdominal surgeries and SBO with known ventral wall hernias. Admission labs notable for elevated lipase to 416. CT AP with loops of bowl within the hernias, no transition point. Fat stranding at the pancreas, concerning for possible pancreatitis. She is passing gas and had no addition obstructive symptoms such as nausea or emesis. Admitted for observation. Requested a manual disimpaction, which was performed in the emergency department. RUQ US without gallstones, no hx of alcohol use, triglycerides normal.      Received a suppository without success, still feels as though she needs to have a bowel movement.      Plan:  Neuro - PRN tylenol  Resp - encourage IS. wean O2 as tolerated, goal SpO2 >92%  CV - continue home meds  GI - continue diabetic diet, bowel regimen with senna, docusate, bisacodyl, fleet enema   - replete lytes prn, IVF @ 40 ml/hr  Endo- CHAD   ID - continue to monitor CBC  MSK - OOB as tolerated, PT/OT  DVT ppx - lvx, SCDs    Dispo: RNF    Seen and discussed with attending physician Dr. Oleksandr Craig MD  Acute Care Surgery [id number]    ==============================================================================  CHIEF COMPLAINT / EVENTS LAST 24HRS / HPI:  MARY. Continues to feel as though she needs to have a  bowel movement, has been passing flatus. Received suppository without success. Tolerating a diet without nausea or emesis.     MEDICAL HISTORY / ROS:   Admission history and ROS reviewed. Pertinent changes as follows:  NA    PHYSICAL EXAM:  Heart Rate:  [56-82]   Temp:  [36.1 °C (97 °F)-36.8 °C (98.3 °F)]   Resp:  [17-18]   BP: (112-136)/(68-83)   SpO2:  [95 %-99 %]   Physical Exam  Neurological: Awake, alert, NAD  Respiratory/Thorax: even, unlabored, symmetric chest rise, saturating well on room air  Genitourinary: voiding  Gastrointestinal: soft, non-tender, non-distended.  Previous midline incision well approximated.  Skin: warm, dry  Musculoskeletal: INIGUEZ  Eyes: non-icteric  Extremities: no edema   Psychological: appropriate mood/affect      IMAGING SUMMARY:    US gallbladder   Final Result   Gallbladder sludge without evidence of cholelithiasis, cholecystitis   or biliary dilation.        MACRO:   None             Signed by: Jas Espinoza 6/30/2024 9:13 AM   Dictation workstation:   [id number]      CT abdomen pelvis w IV contrast   Final Result   1. Multiple ventral abdominal wall hernias and postsurgical changes   from small bowel resections with anastomoses in the mid and lower   abdomen. There is dilatation and fecalization of both anastomotic   loops of small bowel with upstream dilatation of the upper abdominal   anastomotic loop which protrudes through the superior most ventral   abdominal wall hernia and involvement of the lower abdominal   anastomotic loop in the lower ventral abdominal wall hernia. Multiple   nondilated gas and stool filled loops of large bowel are involved in   the hernias as above. A component of developing obstruction can not   be excluded.   2. Mild soft tissue stranding at the pancreatic body and tail, may be   secondary to motion artifact versus acute pancreatitis. Please   correlate with lipase level.   3. Hepatomegaly.   4. Left nephrolithiasis.   5. Colonic diverticulosis.                             I personally reviewed the images/study and I agree with the findings   as stated.        MACRO:   Chanel Beasley discussed the significance and urgency of this   critical finding by telephone with  Dr. Castellon on 6/30/2024 at 5:04   am.  (**-RCF-**) Findings:  See findings.                  Signed by: Chanel Beasley 6/30/2024 5:08 AM   Dictation workstation:   [id number]      XR abdomen 1 view   Final Result        Moderate amount of stool throughout the colon but without a   radiographic findings suggesting large amount in the rectum.        Bowel-gas pattern nonobstructive.        Signed by: Jorge Armstrong 6/29/2024 11:07 PM   Dictation workstation:   [id number]            LABS:  Results from last 7 days   Lab Units 07/01/24  0607 06/30/24  0112   WBC AUTO x10*3/uL 5.8 7.7   HEMOGLOBIN g/dL 11.0* 12.4   HEMATOCRIT % 33.1* 37.5   PLATELETS AUTO x10*3/uL 270 358   NEUTROS PCT AUTO %  --  62.7   LYMPHS PCT AUTO %  --  28.9   MONOS PCT AUTO %  --  6.4   EOS PCT AUTO %  --  1.4         Results from last 7 days   Lab Units 07/01/24  0608 06/30/24  0112   SODIUM mmol/L 138 140   POTASSIUM mmol/L 4.3 4.0   CHLORIDE mmol/L 105 104   CO2 mmol/L 23 25   BUN mg/dL 10 10   CREATININE mg/dL 0.95 1.11*   CALCIUM mg/dL 8.5* 9.6   PROTEIN TOTAL g/dL  --  8.4*   BILIRUBIN TOTAL mg/dL  --  0.3   ALK PHOS U/L  --  78   ALT U/L  --  9   AST U/L  --  8*   GLUCOSE mg/dL 103* 143*     Results from last 7 days   Lab Units 06/30/24  0112   BILIRUBIN TOTAL mg/dL 0.3           I have reviewed all medications, laboratory results, and imaging pertinent for today's encounter.

## 2024-07-02 VITALS
HEART RATE: 73 BPM | HEIGHT: 69 IN | DIASTOLIC BLOOD PRESSURE: 80 MMHG | TEMPERATURE: 96.6 F | SYSTOLIC BLOOD PRESSURE: 116 MMHG | OXYGEN SATURATION: 97 % | WEIGHT: 290 LBS | BODY MASS INDEX: 42.95 KG/M2 | RESPIRATION RATE: 18 BRPM

## 2024-07-02 LAB
GLUCOSE BLD MANUAL STRIP-MCNC: 110 MG/DL (ref 74–99)
GLUCOSE BLD MANUAL STRIP-MCNC: 147 MG/DL (ref 74–99)

## 2024-07-02 PROCEDURE — 2500000004 HC RX 250 GENERAL PHARMACY W/ HCPCS (ALT 636 FOR OP/ED): Performed by: SURGERY

## 2024-07-02 PROCEDURE — 2500000001 HC RX 250 WO HCPCS SELF ADMINISTERED DRUGS (ALT 637 FOR MEDICARE OP): Performed by: SURGERY

## 2024-07-02 PROCEDURE — 82947 ASSAY GLUCOSE BLOOD QUANT: CPT

## 2024-07-02 RX ADMIN — HEPARIN SODIUM 7500 UNITS: 5000 INJECTION INTRAVENOUS; SUBCUTANEOUS at 08:32

## 2024-07-02 RX ADMIN — Medication 2000 UNITS: at 08:32

## 2024-07-02 RX ADMIN — AMLODIPINE BESYLATE 10 MG: 10 TABLET ORAL at 08:32

## 2024-07-02 RX ADMIN — HYDROCHLOROTHIAZIDE 12.5 MG: 25 TABLET ORAL at 08:32

## 2024-07-02 RX ADMIN — HEPARIN SODIUM 7500 UNITS: 5000 INJECTION INTRAVENOUS; SUBCUTANEOUS at 00:31

## 2024-07-02 RX ADMIN — ACETAMINOPHEN 650 MG: 325 TABLET ORAL at 07:37

## 2024-07-02 RX ADMIN — POLYETHYLENE GLYCOL 3350 17 G: 17 POWDER, FOR SOLUTION ORAL at 08:32

## 2024-07-02 RX ADMIN — DOCUSATE SODIUM 100 MG: 100 CAPSULE, LIQUID FILLED ORAL at 08:32

## 2024-07-02 RX ADMIN — BISACODYL 10 MG: 10 SUPPOSITORY RECTAL at 08:32

## 2024-07-02 ASSESSMENT — COGNITIVE AND FUNCTIONAL STATUS - GENERAL
DAILY ACTIVITIY SCORE: 24
MOBILITY SCORE: 21
WALKING IN HOSPITAL ROOM: A LITTLE
CLIMB 3 TO 5 STEPS WITH RAILING: A LOT

## 2024-07-02 ASSESSMENT — PAIN SCALES - GENERAL
PAINLEVEL_OUTOF10: 3
PAINLEVEL_OUTOF10: 5 - MODERATE PAIN

## 2024-07-02 ASSESSMENT — PAIN DESCRIPTION - LOCATION: LOCATION: ABDOMEN

## 2024-07-02 NOTE — NURSING NOTE
Discharge instructions reviewed with patient. Pt denies any questions at this time.   Pt presents to ED with c/o high BP. Pt reports that she took it at home today and it was 180/115 which is not normal for her. PT has not had issues with BP in the past. ABC intact. Pt states that she is having some chest pain across her chest for a couple days that is worse with palpation. Also some intermittent dizziness.

## 2024-07-02 NOTE — PROGRESS NOTES
Confirmed with  ACS , when medically ready pt will discharge with no care coordination needs.      TE TRIPATHI RN

## 2024-07-02 NOTE — DISCHARGE SUMMARY
Discharge Diagnosis  Pancreatitis, unspecified pancreatitis type ([facility])    Issues Requiring Follow-Up  none    Test Results Pending At Discharge  Pending Labs       No current pending labs.            Hospital Course   Felicia Hartman is a 53 y.o. year old female patient with a past medical history of hypertension, hyperlipemia, DM2, CKD II, previous SBO, left inguinal hernia repair with mesh, exploratory laparotomy and small bowel resection and lysis of adhesions x 2 who presented on 6/30 with complaints of inability to have a bowel movement. Imaging demonstrated multiple bowel-containing abdominal wall hernias with no evidence of obstruction. Symptoms not consistent with bowel obstruction, consistent with constipation. Also noted to have and elevated lipase and stranding around the pancreas on CT concerning for possible pancreatitis. Admitted to the Acute Care Service for observation. She was manually disimpacted and started on a bowel regimen including senna, docusate and bisacodyl. She was given a regular diet. She was able to have several bowel movements on hospital day 1 and her constipation symptoms improved. Her lipase trended downward. At time of discharge on hospital day 2, she was tolerating a regular diet, had no abdominal pain, was passing gas and having bowel movements, was able to void and ambulate about the unit. She was discharged to home in stable condition.     Pertinent Physical Exam At Time of Discharge  Physical Exam  Neurological: Awake, alert, conversive  Respiratory/Thorax: even, unlabored  Genitourinary: voiding  Gastrointestinal: soft, NT, ND  Skin: warm, dry  Musculoskeletal: INIGUEZ  Eyes: non-icteric  Extremities: no edema   Psychological: appropriate mood/affect    Home Medications     Medication List      START taking these medications     bisacodyl 5 mg EC tablet; Commonly known as: Dulcolax; Take 1 tablet (5   mg) by mouth 2 times a day for 5 days. Do not crush, chew, or split.      CHANGE how you take these medications     * polyethylene glycol 17 gram packet; Commonly known as: Glycolax,   Miralax; What changed: Another medication with the same name was added.   Make sure you understand how and when to take each.   * polyethylene glycol 17 gram packet; Commonly known as: Glycolax,   Miralax; Take 17 g by mouth once daily for 3 days.; What changed: You were   already taking a medication with the same name, and this prescription was   added. Make sure you understand how and when to take each.  * This list has 2 medication(s) that are the same as other medications   prescribed for you. Read the directions carefully, and ask your doctor or   other care provider to review them with you.     CONTINUE taking these medications     amLODIPine 10 mg tablet; Commonly known as: Norvasc   atorvastatin 40 mg tablet; Commonly known as: Lipitor   cholecalciferol 50 MCG (2000 UT) tablet; Commonly known as: Vitamin D-3   dicyclomine 10 mg/5 mL syrup; Commonly known as: Bentyl   fluticasone 50 mcg/actuation nasal spray; Commonly known as: Flonase   hydroCHLOROthiazide 25 mg tablet; Commonly known as: HYDRODiuril   metFORMIN  mg 24 hr tablet; Commonly known as: Glucophage-XR   methocarbamol 500 mg tablet; Commonly known as: Robaxin   Trulicity 1.5 mg/0.5 mL pen injector injection; Generic drug:   dulaglutide       Outpatient Follow-Up  No future appointments.    Lisa Craig MD

## 2024-07-02 NOTE — CARE PLAN
The patient's goals for the shift include remain safe throughout this shift    The clinical goals for the shift include patient will have pain relief and remain safe during shift

## 2024-07-03 ENCOUNTER — PHARMACY VISIT (OUTPATIENT)
Dept: PHARMACY | Facility: CLINIC | Age: 54
End: 2024-07-03
Payer: MEDICAID

## 2025-01-06 ENCOUNTER — HOSPITAL ENCOUNTER (EMERGENCY)
Facility: HOSPITAL | Age: 55
Discharge: HOME | End: 2025-01-06
Attending: STUDENT IN AN ORGANIZED HEALTH CARE EDUCATION/TRAINING PROGRAM
Payer: COMMERCIAL

## 2025-01-06 ENCOUNTER — APPOINTMENT (OUTPATIENT)
Dept: RADIOLOGY | Facility: HOSPITAL | Age: 55
End: 2025-01-06
Payer: COMMERCIAL

## 2025-01-06 VITALS
HEART RATE: 69 BPM | DIASTOLIC BLOOD PRESSURE: 82 MMHG | RESPIRATION RATE: 18 BRPM | OXYGEN SATURATION: 99 % | SYSTOLIC BLOOD PRESSURE: 131 MMHG | TEMPERATURE: 98.2 F

## 2025-01-06 DIAGNOSIS — K43.9 VENTRAL HERNIA WITHOUT OBSTRUCTION OR GANGRENE: ICD-10-CM

## 2025-01-06 DIAGNOSIS — R10.84 ABDOMINAL PAIN, GENERALIZED: Primary | ICD-10-CM

## 2025-01-06 LAB
ALBUMIN SERPL BCP-MCNC: 4.1 G/DL (ref 3.4–5)
ALP SERPL-CCNC: 89 U/L (ref 33–110)
ALT SERPL W P-5'-P-CCNC: 16 U/L (ref 7–45)
ANION GAP SERPL CALC-SCNC: 12 MMOL/L (ref 10–20)
AST SERPL W P-5'-P-CCNC: 28 U/L (ref 9–39)
BASOPHILS # BLD AUTO: 0.02 X10*3/UL (ref 0–0.1)
BASOPHILS NFR BLD AUTO: 0.3 %
BILIRUB SERPL-MCNC: 0.5 MG/DL (ref 0–1.2)
BUN SERPL-MCNC: 13 MG/DL (ref 6–23)
CALCIUM SERPL-MCNC: 9 MG/DL (ref 8.6–10.6)
CHLORIDE SERPL-SCNC: 108 MMOL/L (ref 98–107)
CO2 SERPL-SCNC: 29 MMOL/L (ref 21–32)
CREAT SERPL-MCNC: 1.08 MG/DL (ref 0.5–1.05)
EGFRCR SERPLBLD CKD-EPI 2021: 61 ML/MIN/1.73M*2
EOSINOPHIL # BLD AUTO: 0.06 X10*3/UL (ref 0–0.7)
EOSINOPHIL NFR BLD AUTO: 0.9 %
ERYTHROCYTE [DISTWIDTH] IN BLOOD BY AUTOMATED COUNT: 13.6 % (ref 11.5–14.5)
GLUCOSE SERPL-MCNC: 121 MG/DL (ref 74–99)
HCT VFR BLD AUTO: 37.3 % (ref 36–46)
HGB BLD-MCNC: 12.4 G/DL (ref 12–16)
HOLD SPECIMEN: NORMAL
HOLD SPECIMEN: NORMAL
IMM GRANULOCYTES # BLD AUTO: 0.02 X10*3/UL (ref 0–0.7)
IMM GRANULOCYTES NFR BLD AUTO: 0.3 % (ref 0–0.9)
LIPASE SERPL-CCNC: 32 U/L (ref 9–82)
LYMPHOCYTES # BLD AUTO: 1.23 X10*3/UL (ref 1.2–4.8)
LYMPHOCYTES NFR BLD AUTO: 17.9 %
MCH RBC QN AUTO: 28.4 PG (ref 26–34)
MCHC RBC AUTO-ENTMCNC: 33.2 G/DL (ref 32–36)
MCV RBC AUTO: 85 FL (ref 80–100)
MONOCYTES # BLD AUTO: 0.37 X10*3/UL (ref 0.1–1)
MONOCYTES NFR BLD AUTO: 5.4 %
NEUTROPHILS # BLD AUTO: 5.19 X10*3/UL (ref 1.2–7.7)
NEUTROPHILS NFR BLD AUTO: 75.2 %
NRBC BLD-RTO: 0 /100 WBCS (ref 0–0)
PLATELET # BLD AUTO: 304 X10*3/UL (ref 150–450)
POTASSIUM SERPL-SCNC: 3.7 MMOL/L (ref 3.5–5.3)
PROT SERPL-MCNC: 7.4 G/DL (ref 6.4–8.2)
RBC # BLD AUTO: 4.37 X10*6/UL (ref 4–5.2)
SODIUM SERPL-SCNC: 145 MMOL/L (ref 136–145)
WBC # BLD AUTO: 6.9 X10*3/UL (ref 4.4–11.3)

## 2025-01-06 PROCEDURE — 2500000004 HC RX 250 GENERAL PHARMACY W/ HCPCS (ALT 636 FOR OP/ED): Mod: SE

## 2025-01-06 PROCEDURE — 99285 EMERGENCY DEPT VISIT HI MDM: CPT | Mod: 25 | Performed by: STUDENT IN AN ORGANIZED HEALTH CARE EDUCATION/TRAINING PROGRAM

## 2025-01-06 PROCEDURE — 2500000001 HC RX 250 WO HCPCS SELF ADMINISTERED DRUGS (ALT 637 FOR MEDICARE OP): Mod: SE

## 2025-01-06 PROCEDURE — 83690 ASSAY OF LIPASE: CPT

## 2025-01-06 PROCEDURE — 85025 COMPLETE CBC W/AUTO DIFF WBC: CPT

## 2025-01-06 PROCEDURE — 74177 CT ABD & PELVIS W/CONTRAST: CPT

## 2025-01-06 PROCEDURE — 2550000001 HC RX 255 CONTRASTS: Mod: SE | Performed by: STUDENT IN AN ORGANIZED HEALTH CARE EDUCATION/TRAINING PROGRAM

## 2025-01-06 PROCEDURE — 96374 THER/PROPH/DIAG INJ IV PUSH: CPT | Mod: 59

## 2025-01-06 PROCEDURE — 80053 COMPREHEN METABOLIC PANEL: CPT

## 2025-01-06 PROCEDURE — 36415 COLL VENOUS BLD VENIPUNCTURE: CPT

## 2025-01-06 PROCEDURE — 96375 TX/PRO/DX INJ NEW DRUG ADDON: CPT

## 2025-01-06 RX ORDER — ALUMINUM HYDROXIDE, MAGNESIUM HYDROXIDE, AND SIMETHICONE 1200; 120; 1200 MG/30ML; MG/30ML; MG/30ML
10 SUSPENSION ORAL ONCE
Status: COMPLETED | OUTPATIENT
Start: 2025-01-06 | End: 2025-01-06

## 2025-01-06 RX ORDER — FAMOTIDINE 20 MG/1
20 TABLET, FILM COATED ORAL ONCE
Status: COMPLETED | OUTPATIENT
Start: 2025-01-06 | End: 2025-01-06

## 2025-01-06 RX ORDER — MORPHINE SULFATE 4 MG/ML
4 INJECTION INTRAVENOUS ONCE
Status: COMPLETED | OUTPATIENT
Start: 2025-01-06 | End: 2025-01-06

## 2025-01-06 RX ORDER — ONDANSETRON HYDROCHLORIDE 2 MG/ML
4 INJECTION, SOLUTION INTRAVENOUS ONCE
Status: COMPLETED | OUTPATIENT
Start: 2025-01-06 | End: 2025-01-06

## 2025-01-06 RX ORDER — FAMOTIDINE 20 MG/1
20 TABLET, FILM COATED ORAL 2 TIMES DAILY
Qty: 30 TABLET | Refills: 0 | Status: SHIPPED | OUTPATIENT
Start: 2025-01-06 | End: 2025-01-21

## 2025-01-06 RX ADMIN — IOHEXOL 90 ML: 350 INJECTION, SOLUTION INTRAVENOUS at 07:11

## 2025-01-06 RX ADMIN — MORPHINE SULFATE 4 MG: 4 INJECTION INTRAVENOUS at 05:35

## 2025-01-06 RX ADMIN — ALUMINUM HYDROXIDE, MAGNESIUM HYDROXIDE, AND SIMETHICONE 10 ML: 200; 200; 20 SUSPENSION ORAL at 07:55

## 2025-01-06 RX ADMIN — FAMOTIDINE 20 MG: 20 TABLET, FILM COATED ORAL at 07:55

## 2025-01-06 RX ADMIN — ONDANSETRON 4 MG: 2 INJECTION INTRAMUSCULAR; INTRAVENOUS at 05:36

## 2025-01-06 ASSESSMENT — PAIN DESCRIPTION - LOCATION
LOCATION: ABDOMEN
LOCATION: ABDOMEN

## 2025-01-06 ASSESSMENT — PAIN SCALES - GENERAL
PAINLEVEL_OUTOF10: 10 - WORST POSSIBLE PAIN
PAINLEVEL_OUTOF10: 0 - NO PAIN
PAINLEVEL_OUTOF10: 10 - WORST POSSIBLE PAIN
PAINLEVEL_OUTOF10: 8

## 2025-01-06 ASSESSMENT — PAIN DESCRIPTION - PAIN TYPE
TYPE: ACUTE PAIN
TYPE: ACUTE PAIN

## 2025-01-06 ASSESSMENT — PAIN - FUNCTIONAL ASSESSMENT
PAIN_FUNCTIONAL_ASSESSMENT: 0-10
PAIN_FUNCTIONAL_ASSESSMENT: 0-10

## 2025-01-06 ASSESSMENT — LIFESTYLE VARIABLES
EVER FELT BAD OR GUILTY ABOUT YOUR DRINKING: NO
EVER HAD A DRINK FIRST THING IN THE MORNING TO STEADY YOUR NERVES TO GET RID OF A HANGOVER: NO
HAVE YOU EVER FELT YOU SHOULD CUT DOWN ON YOUR DRINKING: NO
HAVE PEOPLE ANNOYED YOU BY CRITICIZING YOUR DRINKING: NO
TOTAL SCORE: 0

## 2025-01-06 NOTE — DISCHARGE INSTRUCTIONS
You are seen for evaluation of right sided belly pain and vomiting.  Your CTs do not show any signs of any infection or inflammation or anything that needs surgery at this time.  You do have multiple hernias you can follow-up with surgery as needed if these are causing you discomfort.  I sent Pepcid to the pharmacy upstairs as needed to help with any acid on your stomach.

## 2025-01-06 NOTE — PROGRESS NOTES
Emergency Medicine Transition of Care Note.    I received Felicia Hartman in signout from Dr. Trevino.  Please see the previous ED provider note for all HPI, PE and MDM up to the time of signout at 0700. This is in addition to the primary record.    In brief Felicia Hartman is an 54 y.o. female presenting for   Chief Complaint   Patient presents with    Abdominal Pain     At the time of signout we were awaiting: CT imaging and reeval    Medical Decision Making  Patient is a 54-year-old female with past medical history hypertension, hyperlipidemia, T2DM, CKD 2, previous SBO, left inguinal hernia repair with mesh who is presenting with right upper quadrant abdominal pain, nausea and vomiting.  Imaging reviewed for this patient with no acute intra-abdominal findings.  Patient's labs are grossly unremarkable.  Patient was able to tolerate p.o.  She was given additional round of medications with Mylanta and Pepcid for additional supportive care.  At this time believe presentation secondary to mild gastritis without CT image findings.  Patient was informed of her hernias.  No incarceration.  Was given surgery referral as needed.  Patient discharged in stable condition with PCP follow-up        Please see ED course for updates on patient status, results, and disposition.     ED Course as of 01/06/25 0946   Mon Jan 06, 2025   0735 Comprehensive metabolic panel(!)  Creatinine at baseline no hepatic abnormalities no elevated alkaline phosphatase no elevation in lipase [SC]   0735 CBC and Auto Differential  No leukocytosis anemia or thrombocytopenia [SC]   0735 Patient signed out to me pending reevaluation and final reads of CT imaging in the setting of abdominal pain. [SC]   0842 POCUS performed directly over ventral hernias, patient does not have signs of obstruction, no tenderness in those regions.  Also reevaluated patient's gallbladder, no obvious gallstones, no pericholecystic fluid, no sonographic Thompson sign, all  very reassuring.  Will not bill as patient had an ultrasound formally in June and CT today generally normal in this regard.  Patient is tolerating p.o., extremely pleasant, doing well, appropriate for discharge []      ED Course User Index  [] Pee Antonio MD  [SC] Ibeth Jimenez DO         Diagnoses as of 01/06/25 0946   Abdominal pain, generalized   Ventral hernia without obstruction or gangrene           Final diagnoses:   [R10.84] Abdominal pain, generalized   [K43.9] Ventral hernia without obstruction or gangrene           Procedure  Procedures    Patient seen and discussed with No att. providers found    Ibeth Jimenez DO  PGY-3 Emergency Medicine

## 2025-01-06 NOTE — ED TRIAGE NOTES
Pt arrived to ED via EMS for severe RUQ abdominal pain and n/v. Pt describes the pain as sudden and stabbing. Denies dizziness, chest pain, sob. VSS on arrival.

## 2025-01-06 NOTE — ED PROVIDER NOTES
HPI   Chief Complaint   Patient presents with    Abdominal Pain       Patient is a pleasant 54-year-old female with past medical history hypertension, hyperlipidemia, T2DM, CKD 2, previous SBO, left inguinal hernia repair with mesh who is presenting with right upper quadrant abdominal pain, nausea and vomiting.  Patient reports symptoms started suddenly at approximately 3 AM without obvious provocation.  Reports that she woke up from sleep to use the bathroom and symptoms started shortly afterwards.  Reports yesterday she was well and has no symptoms prior to this.  Denies fevers, chills, malaise, and no nausea and vomiting prior to the episode immediately prior to arrival.  Endorses emesis was without blood.  Endorses regular bowel movements prior.            Patient History   Past Medical History:   Diagnosis Date    Disorder of lipoprotein metabolism, unspecified 2019    Borderline high cholesterol    Personal history of other diseases of the circulatory system 2019    History of hypertension     Past Surgical History:   Procedure Laterality Date    BREAST BIOPSY Bilateral     OTHER SURGICAL HISTORY  2019    Hernia repair    OTHER SURGICAL HISTORY  2019    Foot surgery    OTHER SURGICAL HISTORY  2019     section    OTHER SURGICAL HISTORY  2019    Adhesiolysis     Family History   Problem Relation Name Age of Onset    Breast cancer Mother  70     Social History     Tobacco Use    Smoking status: Former     Types: Cigarettes    Smokeless tobacco: Never   Substance Use Topics    Alcohol use: Never    Drug use: Never       Physical Exam   ED Triage Vitals [25 0454]   Temperature Heart Rate Respirations BP   36.8 °C (98.2 °F) 69 18 131/82      Pulse Ox Temp Source Heart Rate Source Patient Position   99 % Oral -- --      BP Location FiO2 (%)     -- --       Physical Exam  Constitutional:       Appearance: Normal appearance.   HENT:      Head: Normocephalic and  atraumatic.   Eyes:      Extraocular Movements: Extraocular movements intact.   Cardiovascular:      Rate and Rhythm: Normal rate.   Pulmonary:      Effort: Pulmonary effort is normal.   Abdominal:      Comments: Obese, soft, nondistended, mild right upper quadrant tenderness to palpation.   Musculoskeletal:         General: Normal range of motion.      Cervical back: Normal range of motion.   Skin:     General: Skin is warm and dry.   Neurological:      General: No focal deficit present.      Mental Status: She is alert and oriented to person, place, and time.   Psychiatric:         Mood and Affect: Mood normal.         Behavior: Behavior normal.           ED Course & [facility]   ED Course as of 01/07/25 2152 Mon Jan 06, 2025   0735 Comprehensive metabolic panel(!)  Creatinine at baseline no hepatic abnormalities no elevated alkaline phosphatase no elevation in lipase [SC]   0735 CBC and Auto Differential  No leukocytosis anemia or thrombocytopenia [SC]   0735 Patient signed out to me pending reevaluation and final reads of CT imaging in the setting of abdominal pain. [SC]   0842 POCUS performed directly over ventral hernias, patient does not have signs of obstruction, no tenderness in those regions.  Also reevaluated patient's gallbladder, no obvious gallstones, no pericholecystic fluid, no sonographic Thompson sign, all very reassuring.  Will not bill as patient had an ultrasound formally in June and CT today generally normal in this regard.  Patient is tolerating p.o., extremely pleasant, doing well, appropriate for discharge [JH]      ED Course User Index  [JH] Pee Antonio MD  [SC] Ibeth Jimenez,          Diagnoses as of 01/07/25 2152   Abdominal pain, generalized   Ventral hernia without obstruction or gangrene                 No data recorded     Joanna Coma Scale Score: 15 (01/06/25 0458 : Tia Georges, BRIDGET)                           Medical Decision Making  EKG shows a normal rate of 67bpm, normal sinus  rhythm, normal axis,  ms, QRS 98 ms, QTc 418 ms. Normal ST and T wave pattern with no evidence of acute ischemia or other acute findings.    Patient is a 54-year-old female with past medical history hypertension, hyperlipidemia, T2DM, CKD 2, previous SBO, left inguinal hernia repair with mesh who is presenting with right upper quadrant abdominal pain, nausea and vomiting.  No clear etiology of patient's symptoms with biliary pathology differential based on history.  Basic labs with no leukocytosis, no significant electrolyte abnormalities, no lipase elevation.  CT scan obtained with no significant biliary or other pathology noted on ED provider independent interpretation.  Handed off pending radiology interpretation of CT scan and symptomatic reassessment.    Patient seen and discussed with Dr. Daryl Trevino MD, PhD  Emergency Medicine PGY3          Procedure  Procedures     Yaya Trevino MD  Resident  01/07/25 5298

## 2025-01-17 ENCOUNTER — APPOINTMENT (OUTPATIENT)
Dept: SURGERY | Facility: CLINIC | Age: 55
End: 2025-01-17
Payer: COMMERCIAL